# Patient Record
Sex: FEMALE | Race: WHITE | Employment: UNEMPLOYED | URBAN - METROPOLITAN AREA
[De-identification: names, ages, dates, MRNs, and addresses within clinical notes are randomized per-mention and may not be internally consistent; named-entity substitution may affect disease eponyms.]

---

## 2017-01-05 ENCOUNTER — OFFICE VISIT (OUTPATIENT)
Dept: FAMILY MEDICINE CLINIC | Age: 52
End: 2017-01-05

## 2017-01-05 VITALS
HEART RATE: 77 BPM | OXYGEN SATURATION: 98 % | BODY MASS INDEX: 25.99 KG/M2 | RESPIRATION RATE: 16 BRPM | WEIGHT: 156 LBS | DIASTOLIC BLOOD PRESSURE: 70 MMHG | HEIGHT: 65 IN | SYSTOLIC BLOOD PRESSURE: 124 MMHG

## 2017-01-05 DIAGNOSIS — Z23 NEED FOR SHINGLES VACCINE: ICD-10-CM

## 2017-01-05 DIAGNOSIS — Z00.00 ANNUAL PHYSICAL EXAM: Primary | ICD-10-CM

## 2017-01-05 DIAGNOSIS — Z23 NEED FOR TDAP VACCINATION: ICD-10-CM

## 2017-01-05 LAB
A/G RATIO: 1.5 (ref 1.1–2.2)
ALBUMIN SERPL-MCNC: 4.3 G/DL (ref 3.4–5)
ALP BLD-CCNC: 59 U/L (ref 40–129)
ALT SERPL-CCNC: 12 U/L (ref 10–40)
ANION GAP SERPL CALCULATED.3IONS-SCNC: 15 MMOL/L (ref 3–16)
AST SERPL-CCNC: 13 U/L (ref 15–37)
BILIRUB SERPL-MCNC: 0.4 MG/DL (ref 0–1)
BUN BLDV-MCNC: 9 MG/DL (ref 7–20)
CALCIUM SERPL-MCNC: 9.2 MG/DL (ref 8.3–10.6)
CHLORIDE BLD-SCNC: 97 MMOL/L (ref 99–110)
CHOLESTEROL, TOTAL: 245 MG/DL (ref 0–199)
CO2: 25 MMOL/L (ref 21–32)
CREAT SERPL-MCNC: <0.5 MG/DL (ref 0.6–1.1)
GFR AFRICAN AMERICAN: >60
GFR NON-AFRICAN AMERICAN: >60
GLOBULIN: 2.8 G/DL
GLUCOSE BLD-MCNC: 87 MG/DL (ref 70–99)
HCT VFR BLD CALC: 41.4 % (ref 36–48)
HDLC SERPL-MCNC: 46 MG/DL (ref 40–60)
HEMOGLOBIN: 13.5 G/DL (ref 12–16)
LDL CHOLESTEROL CALCULATED: 145 MG/DL
MCH RBC QN AUTO: 29.4 PG (ref 26–34)
MCHC RBC AUTO-ENTMCNC: 32.7 G/DL (ref 31–36)
MCV RBC AUTO: 89.7 FL (ref 80–100)
PDW BLD-RTO: 13.1 % (ref 12.4–15.4)
PLATELET # BLD: 309 K/UL (ref 135–450)
PMV BLD AUTO: 9.4 FL (ref 5–10.5)
POTASSIUM SERPL-SCNC: 4.6 MMOL/L (ref 3.5–5.1)
RBC # BLD: 4.61 M/UL (ref 4–5.2)
SODIUM BLD-SCNC: 137 MMOL/L (ref 136–145)
TOTAL PROTEIN: 7.1 G/DL (ref 6.4–8.2)
TRIGL SERPL-MCNC: 270 MG/DL (ref 0–150)
VITAMIN D 25-HYDROXY: 49.7 NG/ML
VLDLC SERPL CALC-MCNC: 54 MG/DL
WBC # BLD: 6.9 K/UL (ref 4–11)

## 2017-01-05 PROCEDURE — 99396 PREV VISIT EST AGE 40-64: CPT | Performed by: FAMILY MEDICINE

## 2017-01-05 PROCEDURE — 36415 COLL VENOUS BLD VENIPUNCTURE: CPT | Performed by: FAMILY MEDICINE

## 2017-01-05 PROCEDURE — 90715 TDAP VACCINE 7 YRS/> IM: CPT | Performed by: FAMILY MEDICINE

## 2017-01-05 PROCEDURE — 90471 IMMUNIZATION ADMIN: CPT | Performed by: FAMILY MEDICINE

## 2017-07-07 ENCOUNTER — HOSPITAL ENCOUNTER (OUTPATIENT)
Dept: SURGERY | Age: 52
Discharge: HOME | End: 2017-07-07
Attending: INTERNAL MEDICINE | Admitting: INTERNAL MEDICINE

## 2017-07-07 VITALS
SYSTOLIC BLOOD PRESSURE: 101 MMHG | BODY MASS INDEX: 23.32 KG/M2 | HEIGHT: 65 IN | DIASTOLIC BLOOD PRESSURE: 68 MMHG | HEART RATE: 72 BPM | TEMPERATURE: 97.7 F | WEIGHT: 140 LBS | OXYGEN SATURATION: 98 % | RESPIRATION RATE: 16 BRPM

## 2017-07-07 DIAGNOSIS — Z86.010 HISTORY OF COLONIC POLYPS: ICD-10-CM

## 2017-07-07 LAB — PREGNANCY, URINE: NEGATIVE

## 2017-07-07 RX ORDER — LIDOCAINE HYDROCHLORIDE 10 MG/ML
0.1 INJECTION, SOLUTION EPIDURAL; INFILTRATION; INTRACAUDAL; PERINEURAL
Status: ACTIVE | OUTPATIENT
Start: 2017-07-07 | End: 2017-07-07

## 2017-07-07 RX ORDER — SODIUM CHLORIDE, SODIUM LACTATE, POTASSIUM CHLORIDE, CALCIUM CHLORIDE 600; 310; 30; 20 MG/100ML; MG/100ML; MG/100ML; MG/100ML
INJECTION, SOLUTION INTRAVENOUS ONCE
Status: COMPLETED | OUTPATIENT
Start: 2017-07-07 | End: 2017-07-07

## 2017-07-07 RX ADMIN — SODIUM CHLORIDE, SODIUM LACTATE, POTASSIUM CHLORIDE, CALCIUM CHLORIDE: 600; 310; 30; 20 INJECTION, SOLUTION INTRAVENOUS at 09:05

## 2017-07-07 ASSESSMENT — PAIN SCALES - GENERAL
PAINLEVEL_OUTOF10: 0

## 2017-07-07 ASSESSMENT — PAIN - FUNCTIONAL ASSESSMENT: PAIN_FUNCTIONAL_ASSESSMENT: 0-10

## 2017-08-28 ENCOUNTER — OFFICE VISIT (OUTPATIENT)
Dept: FAMILY MEDICINE CLINIC | Age: 52
End: 2017-08-28

## 2017-08-28 VITALS
DIASTOLIC BLOOD PRESSURE: 78 MMHG | BODY MASS INDEX: 24.46 KG/M2 | SYSTOLIC BLOOD PRESSURE: 112 MMHG | HEART RATE: 73 BPM | TEMPERATURE: 98.5 F | WEIGHT: 147 LBS | OXYGEN SATURATION: 98 %

## 2017-08-28 DIAGNOSIS — F32.0 MILD SINGLE CURRENT EPISODE OF MAJOR DEPRESSIVE DISORDER (HCC): ICD-10-CM

## 2017-08-28 DIAGNOSIS — R04.0 EPISTAXIS, RECURRENT: Primary | ICD-10-CM

## 2017-08-28 PROCEDURE — 99214 OFFICE O/P EST MOD 30 MIN: CPT | Performed by: FAMILY MEDICINE

## 2017-08-28 RX ORDER — ESCITALOPRAM OXALATE 10 MG/1
TABLET ORAL
Qty: 30 TABLET | Refills: 3 | Status: SHIPPED | OUTPATIENT
Start: 2017-08-28 | End: 2020-10-26

## 2017-10-09 ENCOUNTER — OFFICE VISIT (OUTPATIENT)
Dept: FAMILY MEDICINE CLINIC | Age: 52
End: 2017-10-09

## 2017-10-09 VITALS
OXYGEN SATURATION: 98 % | DIASTOLIC BLOOD PRESSURE: 76 MMHG | WEIGHT: 151 LBS | BODY MASS INDEX: 25.13 KG/M2 | HEART RATE: 83 BPM | SYSTOLIC BLOOD PRESSURE: 116 MMHG

## 2017-10-09 DIAGNOSIS — F51.09 TERMINAL INSOMNIA: Primary | ICD-10-CM

## 2017-10-09 PROCEDURE — 99213 OFFICE O/P EST LOW 20 MIN: CPT | Performed by: FAMILY MEDICINE

## 2017-10-09 RX ORDER — TRAZODONE HYDROCHLORIDE 50 MG/1
50 TABLET ORAL NIGHTLY
Qty: 30 TABLET | Refills: 5 | Status: SHIPPED | OUTPATIENT
Start: 2017-10-09 | End: 2020-10-26

## 2017-10-09 ASSESSMENT — ENCOUNTER SYMPTOMS: RESPIRATORY NEGATIVE: 1

## 2017-10-09 NOTE — PATIENT INSTRUCTIONS
Cut lexapro in half. Take 1/2 pill daily x 4 days, then 1/2 pill every other day for 4 doses and stop.

## 2017-12-11 LAB
T4 FREE: 0.8
TSH SERPL DL<=0.05 MIU/L-ACNC: 2.27 UIU/ML

## 2018-01-08 ENCOUNTER — HOSPITAL ENCOUNTER (OUTPATIENT)
Dept: MAMMOGRAPHY | Age: 53
Discharge: HOME | End: 2018-01-08
Attending: OBSTETRICS & GYNECOLOGY | Admitting: OBSTETRICS & GYNECOLOGY

## 2018-01-08 DIAGNOSIS — Z12.31 ENCOUNTER FOR SCREENING MAMMOGRAM FOR MALIGNANT NEOPLASM OF BREAST: ICD-10-CM

## 2018-03-05 ENCOUNTER — OFFICE VISIT (OUTPATIENT)
Dept: FAMILY MEDICINE CLINIC | Age: 53
End: 2018-03-05

## 2018-03-05 ENCOUNTER — TELEPHONE (OUTPATIENT)
Dept: FAMILY MEDICINE CLINIC | Age: 53
End: 2018-03-05

## 2018-03-05 VITALS
OXYGEN SATURATION: 98 % | HEART RATE: 72 BPM | DIASTOLIC BLOOD PRESSURE: 86 MMHG | BODY MASS INDEX: 25.49 KG/M2 | HEIGHT: 65 IN | SYSTOLIC BLOOD PRESSURE: 134 MMHG | WEIGHT: 153 LBS

## 2018-03-05 DIAGNOSIS — E03.9 ACQUIRED HYPOTHYROIDISM: ICD-10-CM

## 2018-03-05 DIAGNOSIS — D17.0 LIPOMA OF NECK: ICD-10-CM

## 2018-03-05 DIAGNOSIS — H81.10 BENIGN PAROXYSMAL POSITIONAL VERTIGO, UNSPECIFIED LATERALITY: ICD-10-CM

## 2018-03-05 DIAGNOSIS — D35.2 PITUITARY MICROADENOMA (HCC): ICD-10-CM

## 2018-03-05 DIAGNOSIS — Z00.00 ANNUAL PHYSICAL EXAM: Primary | ICD-10-CM

## 2018-03-05 LAB
A/G RATIO: 1.6 (ref 1.1–2.2)
ALBUMIN SERPL-MCNC: 4.4 G/DL (ref 3.4–5)
ALP BLD-CCNC: 57 U/L (ref 40–129)
ALT SERPL-CCNC: 11 U/L (ref 10–40)
ANION GAP SERPL CALCULATED.3IONS-SCNC: 17 MMOL/L (ref 3–16)
AST SERPL-CCNC: 14 U/L (ref 15–37)
BILIRUB SERPL-MCNC: 0.5 MG/DL (ref 0–1)
BUN BLDV-MCNC: 8 MG/DL (ref 7–20)
CALCIUM SERPL-MCNC: 9 MG/DL (ref 8.3–10.6)
CHLORIDE BLD-SCNC: 101 MMOL/L (ref 99–110)
CHOLESTEROL, TOTAL: 221 MG/DL (ref 0–199)
CO2: 23 MMOL/L (ref 21–32)
CREAT SERPL-MCNC: 0.5 MG/DL (ref 0.6–1.1)
GFR AFRICAN AMERICAN: >60
GFR NON-AFRICAN AMERICAN: >60
GLOBULIN: 2.8 G/DL
GLUCOSE BLD-MCNC: 94 MG/DL (ref 70–99)
HCT VFR BLD CALC: 39.6 % (ref 36–48)
HDLC SERPL-MCNC: 47 MG/DL (ref 40–60)
HEMOGLOBIN: 13 G/DL (ref 12–16)
HEPATITIS C ANTIBODY INTERPRETATION: NORMAL
LDL CHOLESTEROL CALCULATED: 132 MG/DL
MCH RBC QN AUTO: 29.9 PG (ref 26–34)
MCHC RBC AUTO-ENTMCNC: 32.9 G/DL (ref 31–36)
MCV RBC AUTO: 91 FL (ref 80–100)
PDW BLD-RTO: 13.5 % (ref 12.4–15.4)
PLATELET # BLD: 329 K/UL (ref 135–450)
PMV BLD AUTO: 9.5 FL (ref 5–10.5)
POTASSIUM SERPL-SCNC: 4.6 MMOL/L (ref 3.5–5.1)
RBC # BLD: 4.35 M/UL (ref 4–5.2)
SODIUM BLD-SCNC: 141 MMOL/L (ref 136–145)
TOTAL PROTEIN: 7.2 G/DL (ref 6.4–8.2)
TRIGL SERPL-MCNC: 210 MG/DL (ref 0–150)
VLDLC SERPL CALC-MCNC: 42 MG/DL
WBC # BLD: 6.1 K/UL (ref 4–11)

## 2018-03-05 PROCEDURE — 99396 PREV VISIT EST AGE 40-64: CPT | Performed by: FAMILY MEDICINE

## 2018-03-05 PROCEDURE — 36415 COLL VENOUS BLD VENIPUNCTURE: CPT | Performed by: FAMILY MEDICINE

## 2018-03-05 ASSESSMENT — ENCOUNTER SYMPTOMS
COLOR CHANGE: 0
BLOOD IN STOOL: 0
COUGH: 0
ABDOMINAL DISTENTION: 0
SHORTNESS OF BREATH: 0
CHEST TIGHTNESS: 0
WHEEZING: 0
DIARRHEA: 0
EYES NEGATIVE: 1
VOMITING: 0
CONSTIPATION: 0
ABDOMINAL PAIN: 0
NAUSEA: 0

## 2018-03-05 NOTE — PROGRESS NOTES
Assessment/Plan:    Gill Jones was seen today for annual exam.    Diagnoses and all orders for this visit:    Annual physical exam  -     Comprehensive Metabolic Panel  -     CBC  -     Lipid Panel  -     HIV Screen  -     Hepatitis C Antibody   Sees Dr Noah Crandall next wk for pap    Acquired hypothyroidism   Per Dr Kristin Burr microadenoma [facility])   Per Dr Jose Ramon Guillen    Lipoma of neck   If it becomes bothersome, will refer to Dr Gabbi Blake.     Benign paroxysmal positional vertigo, unspecified laterality   Resolved with PT

## 2018-03-06 LAB
HIV AG/AB: NORMAL
HIV ANTIGEN: NORMAL
HIV-1 ANTIBODY: NORMAL
HIV-2 AB: NORMAL

## 2019-01-23 ENCOUNTER — HOSPITAL ENCOUNTER (OUTPATIENT)
Dept: WOMENS IMAGING | Age: 54
Discharge: HOME | End: 2019-01-23
Payer: COMMERCIAL

## 2019-01-23 DIAGNOSIS — Z13.820 ENCOUNTER FOR IMAGING TO ASSESS OSTEOPOROSIS: ICD-10-CM

## 2019-01-23 PROCEDURE — 77067 SCR MAMMO BI INCL CAD: CPT

## 2019-03-18 ENCOUNTER — OFFICE VISIT (OUTPATIENT)
Dept: FAMILY MEDICINE CLINIC | Age: 54
End: 2019-03-18
Payer: COMMERCIAL

## 2019-03-18 VITALS
OXYGEN SATURATION: 98 % | WEIGHT: 142 LBS | RESPIRATION RATE: 16 BRPM | SYSTOLIC BLOOD PRESSURE: 110 MMHG | BODY MASS INDEX: 24.24 KG/M2 | HEART RATE: 78 BPM | HEIGHT: 64 IN | DIASTOLIC BLOOD PRESSURE: 82 MMHG

## 2019-03-18 DIAGNOSIS — Z00.00 ANNUAL PHYSICAL EXAM: Primary | ICD-10-CM

## 2019-03-18 DIAGNOSIS — F51.01 PRIMARY INSOMNIA: ICD-10-CM

## 2019-03-18 DIAGNOSIS — E03.9 ACQUIRED HYPOTHYROIDISM: ICD-10-CM

## 2019-03-18 LAB
A/G RATIO: 1.4 (ref 1.1–2.2)
ALBUMIN SERPL-MCNC: 4.1 G/DL (ref 3.4–5)
ALP BLD-CCNC: 60 U/L (ref 40–129)
ALT SERPL-CCNC: 12 U/L (ref 10–40)
ANION GAP SERPL CALCULATED.3IONS-SCNC: 13 MMOL/L (ref 3–16)
AST SERPL-CCNC: 14 U/L (ref 15–37)
BILIRUB SERPL-MCNC: <0.2 MG/DL (ref 0–1)
BUN BLDV-MCNC: 11 MG/DL (ref 7–20)
CALCIUM SERPL-MCNC: 9.3 MG/DL (ref 8.3–10.6)
CHLORIDE BLD-SCNC: 101 MMOL/L (ref 99–110)
CHOLESTEROL, TOTAL: 206 MG/DL (ref 0–199)
CO2: 24 MMOL/L (ref 21–32)
CREAT SERPL-MCNC: 0.6 MG/DL (ref 0.6–1.1)
GFR AFRICAN AMERICAN: >60
GFR NON-AFRICAN AMERICAN: >60
GLOBULIN: 2.9 G/DL
GLUCOSE BLD-MCNC: 94 MG/DL (ref 70–99)
HDLC SERPL-MCNC: 42 MG/DL (ref 40–60)
LDL CHOLESTEROL CALCULATED: 124 MG/DL
POTASSIUM SERPL-SCNC: 4.7 MMOL/L (ref 3.5–5.1)
SODIUM BLD-SCNC: 138 MMOL/L (ref 136–145)
TOTAL PROTEIN: 7 G/DL (ref 6.4–8.2)
TRIGL SERPL-MCNC: 199 MG/DL (ref 0–150)
VLDLC SERPL CALC-MCNC: 40 MG/DL

## 2019-03-18 PROCEDURE — 99396 PREV VISIT EST AGE 40-64: CPT | Performed by: FAMILY MEDICINE

## 2019-03-18 PROCEDURE — 36415 COLL VENOUS BLD VENIPUNCTURE: CPT | Performed by: FAMILY MEDICINE

## 2019-03-18 RX ORDER — HYDROXYZINE HYDROCHLORIDE 25 MG/1
25 TABLET, FILM COATED ORAL NIGHTLY PRN
Qty: 30 TABLET | Refills: 0 | Status: SHIPPED | OUTPATIENT
Start: 2019-03-18 | End: 2019-04-10 | Stop reason: SDUPTHER

## 2019-03-18 ASSESSMENT — ENCOUNTER SYMPTOMS
CONSTIPATION: 0
EYES NEGATIVE: 1
VOMITING: 0
DIARRHEA: 0
NAUSEA: 0
BLOOD IN STOOL: 0
SHORTNESS OF BREATH: 0
CHEST TIGHTNESS: 0
COLOR CHANGE: 0
WHEEZING: 0
COUGH: 0
ABDOMINAL PAIN: 0
ABDOMINAL DISTENTION: 0

## 2019-03-18 ASSESSMENT — PATIENT HEALTH QUESTIONNAIRE - PHQ9
SUM OF ALL RESPONSES TO PHQ QUESTIONS 1-9: 0
SUM OF ALL RESPONSES TO PHQ9 QUESTIONS 1 & 2: 0
2. FEELING DOWN, DEPRESSED OR HOPELESS: 0
SUM OF ALL RESPONSES TO PHQ QUESTIONS 1-9: 0
1. LITTLE INTEREST OR PLEASURE IN DOING THINGS: 0

## 2019-04-11 RX ORDER — HYDROXYZINE HYDROCHLORIDE 25 MG/1
TABLET, FILM COATED ORAL
Qty: 30 TABLET | Refills: 0 | Status: SHIPPED | OUTPATIENT
Start: 2019-04-11 | End: 2019-06-01 | Stop reason: SDUPTHER

## 2019-09-16 RX ORDER — HYDROXYZINE HYDROCHLORIDE 25 MG/1
TABLET, FILM COATED ORAL
Qty: 90 TABLET | Refills: 1 | Status: SHIPPED | OUTPATIENT
Start: 2019-09-16 | End: 2020-04-24

## 2020-04-24 RX ORDER — HYDROXYZINE HYDROCHLORIDE 25 MG/1
TABLET, FILM COATED ORAL
Qty: 90 TABLET | Refills: 1 | Status: SHIPPED | OUTPATIENT
Start: 2020-04-24 | End: 2020-10-14

## 2020-05-19 ENCOUNTER — HOSPITAL ENCOUNTER (OUTPATIENT)
Dept: WOMENS IMAGING | Age: 55
Discharge: HOME | End: 2020-05-19
Payer: COMMERCIAL

## 2020-05-19 PROCEDURE — 77063 BREAST TOMOSYNTHESIS BI: CPT

## 2020-05-20 ENCOUNTER — TELEPHONE (OUTPATIENT)
Dept: WOMENS IMAGING | Age: 55
End: 2020-05-20

## 2020-05-21 ENCOUNTER — TELEPHONE (OUTPATIENT)
Dept: WOMENS IMAGING | Age: 55
End: 2020-05-21

## 2020-10-26 ENCOUNTER — OFFICE VISIT (OUTPATIENT)
Dept: FAMILY MEDICINE CLINIC | Age: 55
End: 2020-10-26
Payer: COMMERCIAL

## 2020-10-26 VITALS
BODY MASS INDEX: 27.14 KG/M2 | DIASTOLIC BLOOD PRESSURE: 78 MMHG | HEIGHT: 64 IN | SYSTOLIC BLOOD PRESSURE: 130 MMHG | TEMPERATURE: 97.8 F | WEIGHT: 159 LBS | OXYGEN SATURATION: 98 % | HEART RATE: 82 BPM

## 2020-10-26 LAB
A/G RATIO: 1.5 (ref 1.1–2.2)
ALBUMIN SERPL-MCNC: 4.3 G/DL (ref 3.4–5)
ALP BLD-CCNC: 69 U/L (ref 40–129)
ALT SERPL-CCNC: 14 U/L (ref 10–40)
ANION GAP SERPL CALCULATED.3IONS-SCNC: 15 MMOL/L (ref 3–16)
AST SERPL-CCNC: 15 U/L (ref 15–37)
BASOPHILS ABSOLUTE: 0.1 K/UL (ref 0–0.2)
BASOPHILS RELATIVE PERCENT: 0.9 %
BILIRUB SERPL-MCNC: 0.5 MG/DL (ref 0–1)
BUN BLDV-MCNC: 10 MG/DL (ref 7–20)
CALCIUM SERPL-MCNC: 9.1 MG/DL (ref 8.3–10.6)
CHLORIDE BLD-SCNC: 101 MMOL/L (ref 99–110)
CHOLESTEROL, TOTAL: 255 MG/DL (ref 0–199)
CO2: 22 MMOL/L (ref 21–32)
CREAT SERPL-MCNC: 0.6 MG/DL (ref 0.6–1.1)
EOSINOPHILS ABSOLUTE: 0.1 K/UL (ref 0–0.6)
EOSINOPHILS RELATIVE PERCENT: 1.2 %
GFR AFRICAN AMERICAN: >60
GFR NON-AFRICAN AMERICAN: >60
GLOBULIN: 2.8 G/DL
GLUCOSE BLD-MCNC: 90 MG/DL (ref 70–99)
HCT VFR BLD CALC: 40.7 % (ref 36–48)
HDLC SERPL-MCNC: 43 MG/DL (ref 40–60)
HEMOGLOBIN: 13.4 G/DL (ref 12–16)
LDL CHOLESTEROL CALCULATED: 167 MG/DL
LYMPHOCYTES ABSOLUTE: 2.3 K/UL (ref 1–5.1)
LYMPHOCYTES RELATIVE PERCENT: 31.9 %
MCH RBC QN AUTO: 30 PG (ref 26–34)
MCHC RBC AUTO-ENTMCNC: 33 G/DL (ref 31–36)
MCV RBC AUTO: 90.9 FL (ref 80–100)
MONOCYTES ABSOLUTE: 0.6 K/UL (ref 0–1.3)
MONOCYTES RELATIVE PERCENT: 7.9 %
NEUTROPHILS ABSOLUTE: 4.2 K/UL (ref 1.7–7.7)
NEUTROPHILS RELATIVE PERCENT: 58.1 %
PDW BLD-RTO: 12.9 % (ref 12.4–15.4)
PLATELET # BLD: 378 K/UL (ref 135–450)
PMV BLD AUTO: 8.9 FL (ref 5–10.5)
POTASSIUM SERPL-SCNC: 4.8 MMOL/L (ref 3.5–5.1)
RBC # BLD: 4.47 M/UL (ref 4–5.2)
SODIUM BLD-SCNC: 138 MMOL/L (ref 136–145)
TOTAL PROTEIN: 7.1 G/DL (ref 6.4–8.2)
TRIGL SERPL-MCNC: 226 MG/DL (ref 0–150)
VLDLC SERPL CALC-MCNC: 45 MG/DL
WBC # BLD: 7.2 K/UL (ref 4–11)

## 2020-10-26 PROCEDURE — 36415 COLL VENOUS BLD VENIPUNCTURE: CPT | Performed by: FAMILY MEDICINE

## 2020-10-26 PROCEDURE — 99396 PREV VISIT EST AGE 40-64: CPT | Performed by: FAMILY MEDICINE

## 2020-10-26 RX ORDER — LEVOTHYROXINE SODIUM 88 UG/1
88 TABLET ORAL DAILY
COMMUNITY
Start: 2020-07-06

## 2020-10-26 ASSESSMENT — ENCOUNTER SYMPTOMS
EYES NEGATIVE: 1
ABDOMINAL PAIN: 0
CONSTIPATION: 1
DIARRHEA: 1
NAUSEA: 0
SHORTNESS OF BREATH: 0
BLOOD IN STOOL: 0
COUGH: 0

## 2020-10-26 ASSESSMENT — PATIENT HEALTH QUESTIONNAIRE - PHQ9
SUM OF ALL RESPONSES TO PHQ QUESTIONS 1-9: 0
SUM OF ALL RESPONSES TO PHQ QUESTIONS 1-9: 0
2. FEELING DOWN, DEPRESSED OR HOPELESS: 0
1. LITTLE INTEREST OR PLEASURE IN DOING THINGS: 0
SUM OF ALL RESPONSES TO PHQ9 QUESTIONS 1 & 2: 0
SUM OF ALL RESPONSES TO PHQ QUESTIONS 1-9: 0

## 2020-10-26 NOTE — PROGRESS NOTES
Patient: Norma Walter is a 54 y. o.female who presents today with the following Chief Complaint(s):  Chief Complaint   Patient presents with    Annual Exam     physical         HPI: Pt with hx of bulemia, pit microadenoma s/p bromocriptine x 10 yr and hypothyroidism conts to see farhad Bartholomew. Per pt, prolactin has recently been sl high, but b/c pt has no nipple d/c or visual distortion, no need to resume bromocriptine. Conts [facility] as she has long term. Wt is up 17 lb in past 18 mo despite [facility]. Has felt tired despite slight increase from 75 to 88 mcg 2 mo ago. Sleeps well with hydroxyzine. Feels she is tolerating stress. One son is getting , lives locally, other is doing well in [place], has 11 mo old baby that pt has yet to meet 2/2 covid. Cares for elderly mother. Sister and brother help as well. Denies snoring. Donates blood q 12 wk on avg. Does exercise classes tiw, cleans 6 houses per wk, is very active. Gyn has pt cont ocp to lower risk of female ca. Has menses q 3 mo with Seasonique. No hot flashes. Current Outpatient Medications   Medication Sig Dispense Refill    levothyroxine (SYNTHROID) 88 MCG tablet Take 88 mcg by mouth daily      hydrOXYzine (ATARAX) 25 MG tablet TAKE 1 TABLET BY MOUTH NIGHTLY AS NEEDED FOR INSOMNIA 90 tablet 3    Omega-3 Fatty Acids (FISH OIL PO) Take by mouth      Levonorgest-Eth Estrad 91-Day 0.15-0.03 &0.01 MG TABS Take 1 tablet by mouth daily.  calcium carbonate (OSCAL) 500 MG TABS tablet Take 500 mg by mouth daily.  Multiple Vitamin (MULTIVITAMIN PO) Take  by mouth. No current facility-administered medications for this visit. Patient's past medical history,surgical history, family history, medications,  and allergies  were all reviewed and updated as appropriate today. Review of Systems   Constitutional: Positive for fatigue and unexpected weight change. Negative for activity change and appetite change. HENT: Negative. Eyes: Negative. Respiratory: Negative for cough and shortness of breath. Cardiovascular: Negative for chest pain and palpitations. Gastrointestinal: Positive for constipation (on occasion, presumed IBS) and diarrhea (on occasion, presumed IBS). Negative for abdominal pain, blood in stool and nausea. Genitourinary: Negative for difficulty urinating and dysuria. Musculoskeletal: Negative for arthralgias. Skin: Negative for rash. Neurological: Positive for headaches (noted in traps after workouts with weights). Negative for dizziness. Hematological: Negative. Psychiatric/Behavioral: Negative for dysphoric mood and sleep disturbance. The patient is not nervous/anxious. Physical Exam  Constitutional:       General: She is not in acute distress. Appearance: Normal appearance. She is well-developed. She is not ill-appearing. HENT:      Head: Normocephalic and atraumatic. Right Ear: Tympanic membrane, ear canal and external ear normal.      Left Ear: Tympanic membrane, ear canal and external ear normal.   Eyes:      General: No scleral icterus. Right eye: No discharge. Left eye: No discharge. Extraocular Movements: Extraocular movements intact. Conjunctiva/sclera: Conjunctivae normal.   Neck:      Musculoskeletal: Normal range of motion and neck supple. Vascular: No carotid bruit. Comments: Neg TMG  Cardiovascular:      Rate and Rhythm: Normal rate and regular rhythm. Pulses: Normal pulses. Heart sounds: Normal heart sounds. No murmur. Pulmonary:      Effort: Pulmonary effort is normal. No respiratory distress. Breath sounds: Normal breath sounds. Abdominal:      General: Bowel sounds are normal. There is no distension. Palpations: Abdomen is soft. There is no mass. Tenderness: There is no abdominal tenderness. Musculoskeletal: Normal range of motion. Right lower leg: No edema. Left lower leg: No edema. Lymphadenopathy:      Cervical: No cervical adenopathy. Skin:     General: Skin is warm and dry. Capillary Refill: Capillary refill takes less than 2 seconds. Coloration: Skin is not jaundiced or pale. Findings: No rash. Neurological:      General: No focal deficit present. Mental Status: She is alert and oriented to person, place, and time. Cranial Nerves: No cranial nerve deficit. Deep Tendon Reflexes: Reflexes are normal and symmetric. Psychiatric:         Mood and Affect: Mood normal.         Behavior: Behavior normal.         Thought Content:  Thought content normal.         Judgment: Judgment normal.           /78 (Site: Right Upper Arm, Position: Sitting, Cuff Size: Medium Adult)   Pulse 82   Temp 97.8 °F (36.6 °C)   Ht 5' 4.25\" (1.632 m)   Wt 159 lb (72.1 kg)   SpO2 98%   BMI 27.08 kg/m²     Assessment/Plan:    Audley Hamman was seen today for annual exam.    Diagnoses and all orders for this visit:    Annual physical exam  -     Comprehensive Metabolic Panel  -     Lipid Panel    Fatigue, unspecified type, in pt who donates blood  -     CBC Auto Differential    Pituitary microadenoma [facility]), Acquired hypothyroidism   Per Dr Lauri Patterson

## 2021-06-29 ENCOUNTER — HOSPITAL ENCOUNTER (OUTPATIENT)
Dept: WOMENS IMAGING | Age: 56
Discharge: HOME | End: 2021-06-29
Payer: COMMERCIAL

## 2021-06-29 VITALS — HEIGHT: 65 IN | BODY MASS INDEX: 24.99 KG/M2 | WEIGHT: 150 LBS

## 2021-06-29 DIAGNOSIS — Z12.31 ENCOUNTER FOR SCREENING MAMMOGRAM FOR BREAST CANCER: ICD-10-CM

## 2021-06-29 PROCEDURE — 77063 BREAST TOMOSYNTHESIS BI: CPT

## 2021-09-13 ENCOUNTER — TELEPHONE (OUTPATIENT)
Dept: FAMILY MEDICINE CLINIC | Age: 56
End: 2021-09-13

## 2021-09-13 NOTE — TELEPHONE ENCOUNTER
Pt contacted and she was informed there was no opening for today. She chose to go to an [address] [place] [address] for treatment.

## 2021-09-17 ENCOUNTER — OFFICE VISIT (OUTPATIENT)
Dept: FAMILY MEDICINE CLINIC | Age: 56
End: 2021-09-17
Payer: COMMERCIAL

## 2021-09-17 VITALS
TEMPERATURE: 97.5 F | OXYGEN SATURATION: 98 % | SYSTOLIC BLOOD PRESSURE: 124 MMHG | HEART RATE: 83 BPM | WEIGHT: 161 LBS | DIASTOLIC BLOOD PRESSURE: 80 MMHG | HEIGHT: 65 IN | BODY MASS INDEX: 26.82 KG/M2

## 2021-09-17 DIAGNOSIS — H93.8X3 EAR PRESSURE, BILATERAL: Primary | ICD-10-CM

## 2021-09-17 DIAGNOSIS — R42 VERTIGO: ICD-10-CM

## 2021-09-17 PROCEDURE — 99213 OFFICE O/P EST LOW 20 MIN: CPT | Performed by: REGISTERED NURSE

## 2021-09-17 RX ORDER — MECLIZINE HCL 12.5 MG/1
12.5 TABLET ORAL 3 TIMES DAILY PRN
Qty: 30 TABLET | Refills: 0 | Status: SHIPPED | OUTPATIENT
Start: 2021-09-17 | End: 2022-05-19

## 2021-09-17 ASSESSMENT — ENCOUNTER SYMPTOMS
SHORTNESS OF BREATH: 0
SORE THROAT: 0
NAUSEA: 0
COUGH: 0

## 2021-09-17 ASSESSMENT — PATIENT HEALTH QUESTIONNAIRE - PHQ9
1. LITTLE INTEREST OR PLEASURE IN DOING THINGS: 0
SUM OF ALL RESPONSES TO PHQ9 QUESTIONS 1 & 2: 0
SUM OF ALL RESPONSES TO PHQ QUESTIONS 1-9: 0
2. FEELING DOWN, DEPRESSED OR HOPELESS: 0
SUM OF ALL RESPONSES TO PHQ QUESTIONS 1-9: 0
SUM OF ALL RESPONSES TO PHQ QUESTIONS 1-9: 0

## 2021-09-17 NOTE — PROGRESS NOTES
Patient: Angie Barnard is a 54 y.o. female who presents today with the following Chief Complaint(s):  Chief Complaint   Patient presents with    Ear Problem     Bilateral     Dizziness     Started mo ago with vertigo    Hypertension       Assessment/Plan:    Her main concern today is bilateral ear pressure. There is some mild middle ear effusion greater on the left. Recommend Flonase twice a day for 1 week and then once a day for 3 weeks. Discussed that symptoms could be due to seasonal or environmental allergies. Her vertigo has improved significantly since her chiropractor performed Epley maneuvers on her. Meclizine prescribed. She will follow-up with her chiropractor or ENT if vertigo returns. Referral was given. Her blood pressure is normal at 124/80 today. 1. Ear pressure, bilateral    - meclizine (ANTIVERT) 12.5 MG tablet; Take 1 tablet by mouth 3 times daily as needed for Dizziness  Dispense: 30 tablet; Refill: Kenn Barnett DO, Otolaryngology, [place]    2. Vertigo    - meclizine (ANTIVERT) 12.5 MG tablet; Take 1 tablet by mouth 3 times daily as needed for Dizziness  Dispense: 30 tablet; Refill: Kenn Barnett DO, Otolaryngology, [place]cho Tatum      Return if symptoms worsen or fail to improve. HPI:     She is here for ear pain and vertigo. She had an episode of vertigo on 8/16/21. She was seen by her chiropractor who performed Epley maneuvers. This helped. A week later her left ear started having a feeling of pressure. Then her right ear started having the same sensation. She went to a walk in clinic and she says they did not think she had an ear infection but she was told that her blood pressure was elevated. She still has intermittent vertigo. She did try pseudoephedrine but does not think this helped. Ear Problem  This is a new problem. The current episode started 1 to 4 weeks ago. The problem occurs constantly. Associated symptoms include headaches. She is not ill-appearing. HENT:      Head: Normocephalic and atraumatic. Right Ear: Ear canal and external ear normal. A middle ear effusion is present. There is no impacted cerumen. Tympanic membrane is not scarred, perforated or erythematous. Left Ear: Ear canal and external ear normal. A middle ear effusion is present. There is no impacted cerumen. Tympanic membrane is not scarred, perforated or erythematous. Cardiovascular:      Rate and Rhythm: Normal rate. Pulmonary:      Effort: Pulmonary effort is normal. No respiratory distress. Musculoskeletal:         General: Normal range of motion. Cervical back: Normal range of motion. Right lower leg: No edema. Left lower leg: No edema. Skin:     General: Skin is warm and dry. Coloration: Skin is not jaundiced or pale. Findings: No erythema. Neurological:      General: No focal deficit present. Mental Status: She is alert and oriented to person, place, and time. Psychiatric:         Mood and Affect: Mood normal.         Behavior: Behavior normal.         Thought Content: Thought content normal.         Judgment: Judgment normal.         Patient's past medical history, surgical history, family history, medications,  and allergies  were all reviewed and updated as appropriate today.     Patient Active Problem List   Diagnosis    Bulimia    Pituitary microadenoma ([address]    Family history of colon cancer    Hypothyroid    HLD (hyperlipidemia)    Lipoma of neck     Past Medical History:   Diagnosis Date    Bulimia     history of bulimia in high school    Family history of colon cancer     Cscope q 3 yrs    History of bulimia     high school    HLD (hyperlipidemia)     Hypothyroid 01/2015    Dr Mik Martin, endocrinologist    Pituitary microadenoma     Lifelong bromocriptine was planned though pt was able to d/c it 2015 w/o recurrence of prolactinoma      Past Surgical History:   Procedure Laterality Date    COLONOSCOPY  5/21/10    3 years    COLONOSCOPY  07/07/2017    LAPAROSCOPY      endomitriosis    TONSILLECTOMY      at age 21       Family History   Problem Relation Age of Onset    Colon Cancer Mother         dx at age 48   [place] Cancer Mother 48        colon    Diabetes Father     High Blood Pressure Father     Heart Disease Paternal Grandfather     Cancer Maternal Grandfather         colon cancer age 66    Cancer Other         maternal aunt, colon cancer age 47      No Known Allergies    This chart was generated using the [address] [place] [address] dictation system. I created this record but it may contain dictation errors due to the limitation of the software.

## 2021-10-01 RX ORDER — HYDROXYZINE HYDROCHLORIDE 25 MG/1
TABLET, FILM COATED ORAL
Qty: 90 TABLET | Refills: 3 | Status: SHIPPED | OUTPATIENT
Start: 2021-10-01 | End: 2022-10-03

## 2021-11-08 ENCOUNTER — OFFICE VISIT (OUTPATIENT)
Dept: FAMILY MEDICINE CLINIC | Age: 56
End: 2021-11-08
Payer: COMMERCIAL

## 2021-11-08 VITALS
HEART RATE: 77 BPM | OXYGEN SATURATION: 98 % | HEIGHT: 65 IN | SYSTOLIC BLOOD PRESSURE: 112 MMHG | DIASTOLIC BLOOD PRESSURE: 80 MMHG | WEIGHT: 160 LBS | BODY MASS INDEX: 26.66 KG/M2

## 2021-11-08 DIAGNOSIS — R42 VERTIGO: ICD-10-CM

## 2021-11-08 DIAGNOSIS — D35.2 PITUITARY MICROADENOMA (HCC): ICD-10-CM

## 2021-11-08 DIAGNOSIS — H93.8X3 EAR PRESSURE, BILATERAL: ICD-10-CM

## 2021-11-08 DIAGNOSIS — E78.2 MIXED HYPERLIPIDEMIA: ICD-10-CM

## 2021-11-08 DIAGNOSIS — Z00.00 ANNUAL PHYSICAL EXAM: Primary | ICD-10-CM

## 2021-11-08 DIAGNOSIS — E03.9 ACQUIRED HYPOTHYROIDISM: ICD-10-CM

## 2021-11-08 LAB
A/G RATIO: 1.6 (ref 1.1–2.2)
ALBUMIN SERPL-MCNC: 4.4 G/DL (ref 3.4–5)
ALP BLD-CCNC: 70 U/L (ref 40–129)
ALT SERPL-CCNC: 21 U/L (ref 10–40)
ANION GAP SERPL CALCULATED.3IONS-SCNC: 15 MMOL/L (ref 3–16)
AST SERPL-CCNC: 22 U/L (ref 15–37)
BILIRUB SERPL-MCNC: 0.4 MG/DL (ref 0–1)
BUN BLDV-MCNC: 17 MG/DL (ref 7–20)
CALCIUM SERPL-MCNC: 9.4 MG/DL (ref 8.3–10.6)
CHLORIDE BLD-SCNC: 101 MMOL/L (ref 99–110)
CHOLESTEROL, TOTAL: 244 MG/DL (ref 0–199)
CO2: 21 MMOL/L (ref 21–32)
CREAT SERPL-MCNC: 0.6 MG/DL (ref 0.6–1.1)
GFR AFRICAN AMERICAN: >60
GFR NON-AFRICAN AMERICAN: >60
GLUCOSE BLD-MCNC: 85 MG/DL (ref 70–99)
HDLC SERPL-MCNC: 50 MG/DL (ref 40–60)
LDL CHOLESTEROL CALCULATED: 148 MG/DL
POTASSIUM SERPL-SCNC: 4.6 MMOL/L (ref 3.5–5.1)
SODIUM BLD-SCNC: 137 MMOL/L (ref 136–145)
TOTAL PROTEIN: 7.2 G/DL (ref 6.4–8.2)
TRIGL SERPL-MCNC: 230 MG/DL (ref 0–150)
VLDLC SERPL CALC-MCNC: 46 MG/DL

## 2021-11-08 PROCEDURE — 99396 PREV VISIT EST AGE 40-64: CPT | Performed by: FAMILY MEDICINE

## 2021-11-08 PROCEDURE — 36415 COLL VENOUS BLD VENIPUNCTURE: CPT | Performed by: FAMILY MEDICINE

## 2021-11-08 RX ORDER — FLUTICASONE PROPIONATE 50 MCG
2 SPRAY, SUSPENSION (ML) NASAL DAILY
Qty: 16 G | Refills: 5 | Status: SHIPPED | OUTPATIENT
Start: 2021-11-08 | End: 2021-11-30

## 2021-11-08 NOTE — PROGRESS NOTES
Assessment/Plan:    Tony Alfonso was seen today for annual exam and ear problem. Diagnoses and all orders for this visit:    Annual physical exam    Ear pressure, bilateral  -     fluticasone (FLONASE) 50 MCG/ACT nasal spray; 2 sprays by Each Nostril route daily   Pt is doing better, though eval by Dr Minal Alejandro is very reasonable. Pt will keep upcoming appt. Vertigo  -     fluticasone (FLONASE) 50 MCG/ACT nasal spray; 2 sprays by Each Nostril route daily, agree with resuming. Pt was hesitant bc otc box mentioned flonase should not be used more than 1 mo. Assured pt long term flonase is very acceptable. Pituitary microadenoma [facility])   Per Dr Bart Page    Acquired hypothyroidism   Dr Bart Page follows thyr labs. Mixed hyperlipidemia  -     Lipid Panel  -     Comprehensive Metabolic Panel   Pt hopes to avoid statin. ASCVD 2.2% using last yr's labs. Await current labs. There are no Patient Instructions on file for this visit. Patient: Gerard Dupree is a 64 y. o.female who presents today with the following Chief Complaint(s):  Chief Complaint   Patient presents with    Annual Exam    Ear Problem         HPI: Pt with hx of bulemia, pit microadenoma s/p bromocriptine x 10 yr and hypothyroidism conts to see Dr Bart Page, endo. No nipple d/c or visual distortion. Sleeps well with hydroxyzine. Local son  last yr. Other son in [place] with wife and 3 1/2 yr old granddtr, all doing well. Son and family spend 3 mo with pt in recent mo's, enjoyable. Donates blood q 12 wk on avg, is A-. Does exercise classes tiw, cleans 6 houses per wk, is very active.     Gyn has pt cont ocp to lower risk of female ca. Has menses q 3 mo with Seasonique. No hot flashes.     Pt was seen 9/17/21 for ear pressure and vertigo that had improved when chiro performed Epley. Meclizine and flonase x 1 mo were rx'd. Pt was referred to Dr Minal Alejandro, ENT. Had 1 episode of vertigo remotely.      After stopping flonase after 1 mo, sx's returned. Three days ago, pt resumed flonase and dizziness and ear pressure have improved. Has ENT appt in 1 wk, wonders if she should keep appt. F was diabetic. Bro was recently dx'd as borderline DM. Current Outpatient Medications   Medication Sig Dispense Refill    fluticasone (FLONASE) 50 MCG/ACT nasal spray 2 sprays by Each Nostril route daily 16 g 5    hydrOXYzine (ATARAX) 25 MG tablet TAKE 1 TABLET BY MOUTH NIGHTLY AS NEEDED FOR INSOMNIA. 90 tablet 3    levothyroxine (SYNTHROID) 88 MCG tablet Take 88 mcg by mouth daily      Omega-3 Fatty Acids (FISH OIL PO) Take by mouth      Levonorgest-Eth Estrad 91-Day 0.15-0.03 &0.01 MG TABS Take 1 tablet by mouth daily.  calcium carbonate (OSCAL) 500 MG TABS tablet Take 500 mg by mouth daily.  Multiple Vitamin (MULTIVITAMIN PO) Take  by mouth. No current facility-administered medications for this visit. Patient's past medical history,surgical history, family history, medications,  and allergies  were all reviewed and updated as appropriate today. Review of Systems  abv    Physical Exam  Constitutional:       General: She is not in acute distress. Appearance: Normal appearance. She is well-developed. She is not ill-appearing. HENT:      Head: Normocephalic and atraumatic. Right Ear: Tympanic membrane, ear canal and external ear normal.      Left Ear: Tympanic membrane, ear canal and external ear normal.   Eyes:      General: No scleral icterus. Right eye: No discharge. Left eye: No discharge. Extraocular Movements: Extraocular movements intact. Conjunctiva/sclera: Conjunctivae normal.   Neck:      Vascular: No carotid bruit. Comments: Neg TMG  Cardiovascular:      Rate and Rhythm: Normal rate and regular rhythm. Pulses: Normal pulses. Heart sounds: Normal heart sounds. No murmur heard. Pulmonary:      Effort: Pulmonary effort is normal. No respiratory distress.       Breath sounds: Normal breath sounds. Abdominal:      General: Bowel sounds are normal. There is no distension. Palpations: Abdomen is soft. There is no mass. Tenderness: There is no abdominal tenderness. Musculoskeletal:         General: Normal range of motion. Cervical back: Normal range of motion and neck supple. Right lower leg: No edema. Left lower leg: No edema. Lymphadenopathy:      Cervical: No cervical adenopathy. Skin:     General: Skin is warm and dry. Capillary Refill: Capillary refill takes less than 2 seconds. Coloration: Skin is not jaundiced or pale. Findings: No rash. Neurological:      General: No focal deficit present. Mental Status: She is alert and oriented to person, place, and time. Cranial Nerves: No cranial nerve deficit. Deep Tendon Reflexes: Reflexes are normal and symmetric. Comments: No nystagmus   Psychiatric:         Mood and Affect: Mood normal.         Behavior: Behavior normal.         Thought Content:  Thought content normal.         Judgment: Judgment normal.           /80   Pulse 77   Ht 5' 5\" (1.651 m)   Wt 160 lb (72.6 kg)   SpO2 98%   BMI 26.63 kg/m²       The 10-year ASCVD risk score (Lonza Vlaerie, et al., 2013) is: 2.2%    Values used to calculate the score:      Age: 64 years      Sex: Female      Is Non- : No      Diabetic: No      Tobacco smoker: No      Systolic Blood Pressure: 891 mmHg      Is BP treated: No      HDL Cholesterol: 50 mg/dL      Total Cholesterol: 244 mg/dL

## 2021-11-15 NOTE — PROGRESS NOTES
Gissel Edwards   1965, 64 y.o. female   [id number]       Referring Provider: Florence Ho DO  Referral Type: In an order in [address] [place] [address]    Reason for Visit: Evaluation of the cause of disorders of balance. ADULT AUDIOLOGIC EVALUATION      Gissel Edwards is a 64 y.o. female seen today, 11/16/2021 , for an initial audiologic evaluation. Patient was seen by Florence Ho DO following today's evaluation. AUDIOLOGIC AND OTHER PERTINENT MEDICAL HISTORY:      Gissel Edwards noted episodic dizziness that started August 2021 lasting seconds to minutes at a time. She notes dizziness improved however did not completely resolve after Epley maneuver performed by chiropractor. Patient reports ear pressure, bilaterally following dizziness onset. She also notes family history of hearing loss in mother. No additional significant otologic or medical history was reported. Gissel Edwards denied otalgia, otorrhea, tinnitus, imbalance, history of falls, history of occupational/recreational noise exposure, history of head trauma and history of ear surgery. Date: 11/16/2021     IMPRESSIONS:      Today's results revealed hearing within normal limits. with excellent word recognition, bilaterally. Follow medical recommendations of Florence Ho DO.     ASSESSMENT AND FINDINGS:     Otoscopy revealed: Clear ear canals bilaterally    RIGHT EAR:  Hearing Sensitivity:Hearing sensitivity within normal limits from 250-8000 Hz  Speech Recognition Threshold: 5 dB HL  Word Recognition: Excellent (96), based on NU-6 25-word list at 50 dBHL using recorded speech stimuli. Tympanometry: Normal peak pressure and compliance, Type A tympanogram, consistent with normal middle ear function. LEFT EAR:  Hearing Sensitivity:Hearing sensitivity within normal limits from 250-8000 Hz  Speech Recognition Threshold: 15 dB HL  Word Recognition: Excellent (96%), based on NU-6 25-word list at 50 dBHL using recorded speech stimuli.     Tympanometry: Normal peak pressure and compliance, Type A tympanogram, consistent with normal middle ear function. Reliability: Good   Transducer: Inserts    See scanned audiogram dated 11/16/2021  for results. PATIENT EDUCATION:       The following items were discussed with the patient:   - Good Communication Strategies  - Dizziness    Educational information was shared in the After Visit Summary. RECOMMENDATIONS:                                                                                                                                                                                                                                                            The following items are recommended based on patient report and results from today's appointment:   - Continue medical follow-up with Staci Dunn DO.   - Retest hearing as medically indicated and/or sooner if a change in hearing is noted. - Utilize \"Good Communication Strategies\" as discussed to assist in speech understanding with communication partners. - If medically indicated, consider vestibular evaluation to further investigate symptoms of dizziness.        Pattie Grullon  Audiologist    Chart CC'd to: Staci Dunn DO      Degree of   Hearing Sensitivity dB Range   Within Normal Limits (WNL) 0 - 20   Mild 20 - 40   Moderate 40 - 55   Moderately-Severe 55 - 70   Severe 70 - 90   Profound 90 +

## 2021-11-16 ENCOUNTER — OFFICE VISIT (OUTPATIENT)
Dept: ENT CLINIC | Age: 56
End: 2021-11-16
Payer: COMMERCIAL

## 2021-11-16 ENCOUNTER — PROCEDURE VISIT (OUTPATIENT)
Dept: AUDIOLOGY | Age: 56
End: 2021-11-16
Payer: COMMERCIAL

## 2021-11-16 VITALS
HEIGHT: 65 IN | HEART RATE: 85 BPM | TEMPERATURE: 98.2 F | SYSTOLIC BLOOD PRESSURE: 139 MMHG | BODY MASS INDEX: 26.66 KG/M2 | WEIGHT: 160 LBS | DIASTOLIC BLOOD PRESSURE: 99 MMHG

## 2021-11-16 DIAGNOSIS — H93.8X3 EAR PRESSURE, BILATERAL: ICD-10-CM

## 2021-11-16 DIAGNOSIS — F45.8 BRUXISM: ICD-10-CM

## 2021-11-16 DIAGNOSIS — R42 DIZZINESS: Primary | ICD-10-CM

## 2021-11-16 DIAGNOSIS — H81.10 BENIGN PAROXYSMAL POSITIONAL VERTIGO, UNSPECIFIED LATERALITY: ICD-10-CM

## 2021-11-16 DIAGNOSIS — H93.8X3 PRESSURE SENSATION IN BOTH EARS: Primary | ICD-10-CM

## 2021-11-16 PROCEDURE — 92567 TYMPANOMETRY: CPT | Performed by: AUDIOLOGIST

## 2021-11-16 PROCEDURE — 99203 OFFICE O/P NEW LOW 30 MIN: CPT | Performed by: OTOLARYNGOLOGY

## 2021-11-16 PROCEDURE — 92557 COMPREHENSIVE HEARING TEST: CPT | Performed by: AUDIOLOGIST

## 2021-11-16 ASSESSMENT — ENCOUNTER SYMPTOMS
SORE THROAT: 0
SINUS PRESSURE: 0
COUGH: 0
EYE ITCHING: 0
VOICE CHANGE: 0
SHORTNESS OF BREATH: 0
APNEA: 0
TROUBLE SWALLOWING: 0
FACIAL SWELLING: 0

## 2021-11-16 NOTE — PROGRESS NOTES
[facility], [address], [address], [facility] Edis Ruvalcaba  P: [phone]       Patient     Mary Rogers  1965    ChiefComplaint     Chief Complaint   Patient presents with    New Patient     Bilateral ear pressure and vertigo       History of Present Illness     Mami Loss 68-year-old female here today for evaluation of bilateral ear fullness and sensation of near dizziness. She reports being diagnosed and treated for benign positional vertigo, treated with Epley by chiropractor. This resolved all of her room spinning vertigo. Since that time she had some bilateral ear pressure and fullness sensation with the potential for developing dizziness. Seen by PCP and started on Flonase which resolved all symptoms. She does have a bite guard is been unable to wear as she is to recently had a chipped tooth that was fixed in her record does not fit correctly. Reports hearing is normal.  No history of seasonal allergies.     Past Medical History     Past Medical History:   Diagnosis Date    Bulimia     history of bulimia in high school    Family history of colon cancer     Cscope q 3 yrs    History of bulimia     high school    HLD (hyperlipidemia)     Hypothyroid 01/2015    Dr Bertrand Tucker, endocrinologist    Pituitary microadenoma     Lifelong bromocriptine was planned though pt was able to d/c it 2015 w/o recurrence of prolactinoma       Past Surgical History     Past Surgical History:   Procedure Laterality Date    COLONOSCOPY  5/21/10    3 years    COLONOSCOPY  07/07/2017    LAPAROSCOPY      endomitriosis    TONSILLECTOMY      at age 21       Family History     Family History   Problem Relation Age of Onset    Colon Cancer Mother         dx at age 48   Terri Martins Cancer Mother 48        colon    Diabetes Father     High Blood Pressure Father     Heart Disease Paternal Grandfather     Cancer Maternal Grandfather         colon cancer age 66    Cancer Other         maternal aunt, colon cancer age 47       Social History     Social History     Tobacco Use    Smoking status: Never Smoker    Smokeless tobacco: Never Used   Substance Use Topics    Alcohol use: No    Drug use: No        Allergies     No Known Allergies    Medications     Current Outpatient Medications   Medication Sig Dispense Refill    fluticasone (FLONASE) 50 MCG/ACT nasal spray 2 sprays by Each Nostril route daily 16 g 5    hydrOXYzine (ATARAX) 25 MG tablet TAKE 1 TABLET BY MOUTH NIGHTLY AS NEEDED FOR INSOMNIA. 90 tablet 3    levothyroxine (SYNTHROID) 88 MCG tablet Take 88 mcg by mouth daily      Omega-3 Fatty Acids (FISH OIL PO) Take by mouth      Levonorgest-Eth Estrad 91-Day 0.15-0.03 &0.01 MG TABS Take 1 tablet by mouth daily.  calcium carbonate (OSCAL) 500 MG TABS tablet Take 500 mg by mouth daily.  Multiple Vitamin (MULTIVITAMIN PO) Take  by mouth. No current facility-administered medications for this visit. Review of Systems     Review of Systems   Constitutional: Negative for appetite change, chills, fatigue, fever and unexpected weight change. HENT: Negative for congestion, ear discharge, ear pain, facial swelling, hearing loss, nosebleeds, postnasal drip, sinus pressure, sneezing, sore throat, tinnitus, trouble swallowing and voice change. Eyes: Negative for itching. Respiratory: Negative for apnea, cough and shortness of breath. Endocrine: Negative for cold intolerance and heat intolerance. Musculoskeletal: Negative for myalgias and neck pain. Skin: Negative for rash. Allergic/Immunologic: Negative for environmental allergies. Neurological: Positive for dizziness. Negative for headaches. Psychiatric/Behavioral: Negative for confusion, decreased concentration and sleep disturbance.          PhysicalExam     Vitals:    11/16/21 1015 11/16/21 1017   BP: (!) 139/92 (!) 139/99   Site: Right Upper Arm Left Upper Arm   Position: Sitting Sitting   Pulse: 85    Temp: 98.2 °F (36.8 °C)    Weight: 160 lb (72.6 kg)    Height: 5' 5\" (1.651 m)        Physical Exam  Constitutional:       General: She is not in acute distress. Appearance: She is well-developed. HENT:      Head: Normocephalic and atraumatic. Right Ear: Tympanic membrane, ear canal and external ear normal. No drainage. No middle ear effusion. Tympanic membrane is not bulging. Tympanic membrane has normal mobility. Left Ear: Tympanic membrane, ear canal and external ear normal. No drainage. No middle ear effusion. Tympanic membrane is not bulging. Tympanic membrane has normal mobility. Nose: No mucosal edema or rhinorrhea. Mouth/Throat:      Lips: Pink. Mouth: Mucous membranes are moist.      Tongue: No lesions. Palate: No mass. Pharynx: Uvula midline. Eyes:      Pupils: Pupils are equal, round, and reactive to light. Neck:      Thyroid: No thyroid mass or thyromegaly. Trachea: Trachea and phonation normal.   Cardiovascular:      Pulses: Normal pulses. Pulmonary:      Effort: Pulmonary effort is normal. No accessory muscle usage or respiratory distress. Breath sounds: No stridor. Musculoskeletal:      Cervical back: Full passive range of motion without pain. Lymphadenopathy:      Head:      Right side of head: No submental or submandibular adenopathy. Left side of head: No submental or submandibular adenopathy. Cervical: No cervical adenopathy. Right cervical: No superficial, deep or posterior cervical adenopathy. Left cervical: No superficial, deep or posterior cervical adenopathy. Skin:     General: Skin is warm and dry. Neurological:      Mental Status: She is alert and oriented to person, place, and time. Cranial Nerves: No cranial nerve deficit. Coordination: Coordination normal.      Gait: Gait normal.      Comments: Negative [place]pike bilaterally   Psychiatric:         Thought Content:  Thought content normal.             Assessment and Plan     1. Pressure sensation in both ears  -Provided reassurance today that her ears are of normal appearance and on testing are functioning perfectly  -Discussed that if symptoms improved with Flonase there is likely component of eustachian tube dysfunction. She can continue using Flonase 2 sprays each side daily to aid in symptoms. We also discussed that ear pressure can be secondary to inflammation of the jaw joint. She has not been using her bite guard for the last several months due to a chipped tooth  - Marquita Martinez [place]. D., Audiology, [facility]    2. Bruxism  -Known clenching and grinding at night for which she has a bicarb has not been using  -Reports feeling tightness in her jaw since stopping using her bite guard  -This is likely contributing to the fullness sensation in the ears    3. Benign paroxysmal positional vertigo, unspecified laterality  -Treated successfully by chiropractor negative Nadeen-Hallpike today      Return as needed    Gela Natarajan DO  11/16/21      Portions of this note were dictated using Dragon.  There may be linguistic errors secondary to the use of this program.

## 2021-11-16 NOTE — PATIENT INSTRUCTIONS
Good Communication Strategies    Communication can be challenging for anyone, but can be especially difficult for those with some degree of hearing loss. While we may not be able to control every factor that may lead to difficulty with communication, there are Good Communication Strategies that we can all use in our day-to-day lives. Communication takes both parties working together for it to be successful. Tips as a Listener:   1. Control your environment. It is important to limit the amount of background noise in the room when possible. You should also consider having a good light source in the room to best see the other person. 2. Ask for clarification. Instead of saying \"What?\", you can use parts of what you heard to make a new question. For example, if you heard the word \"Thursday\" but not the rest of the week, you may ask \"What was that about Thursday? \" or \"What did you want to do Thursday? \". This shows the person talking that you are listening and will help them better explain what they are saying. 3. Be an advocate for yourself. If you are hearing but not understanding, tell the other person \"I can hear you, but I need you to slow down when you speak. \"  Or if someone is facing the other direction, say \"I cannot hear you when you are not looking at me when we talk. \"       Tips as a Talker:   - Sit or stand 3 to 6 feet away to maximize audibility         -- It is unrealistic to believe someone else will fully hear your message if you are speaking from across the room or in a different room in the house   - Stay at eye level to help with visual cues   - Make sure you have the persons attention before speaking   - Use facial expressions and gestures to accentuate your message   - Raise your voice slightly (do not scream)   - Speak slowly and distinctly   - Use short, simple sentences   - Rephrase your words if the person is having a hard time understanding you    - To avoid distortion, dont speak directly into a persons ear      Some additional items that may be helpful:   - Remain patient - this is important for both parties   - Write down items that still cannot be heard/understood. You may write with pen/paper or consider typing/texting on a cell phone or smart device. - If background noise is unavoidable, try to keep yourself in a good position in the room. By sitting at a landon on the side of the restaurant (preferably a corner), it will be easier to communicate than if you were sitting at a table in the middle with background noise surrounding you. Keep yourself positioned away from music speakers or heavy foot traffic. Dizziness: Care Instructions  Your Care Instructions  Dizziness is the feeling of unsteadiness or fuzziness in your head. It is different than having vertigo, which is a feeling that the room is spinning or that you are moving or falling. It is also different from lightheadedness, which is the feeling that you are about to faint. It can be hard to know what causes dizziness. Some people feel dizzy when they have migraine headaches. Sometimes bouts of flu can make you feel dizzy. Some medical conditions, such as heart problems or high blood pressure, can make you feel dizzy. Many medicines can cause dizziness, including medicines for high blood pressure, pain, or anxiety. If a medicine causes your symptoms, your doctor may recommend that you stop or change the medicine. If it is a problem with your heart, you may need medicine to help your heart work better. If there is no clear reason for your symptoms, your doctor may suggest watching and waiting for a while to see if the dizziness goes away on its own. Follow-up care is a key part of your treatment and safety. Be sure to make and go to all appointments, and call your doctor if you are having problems. It's also a good idea to know your test results and keep a list of the medicines you take.     How can and sign in to your [employer] account. Enter O625 in the D1G box to learn more about \"Dizziness: Care Instructions. \"     If you do not have an account, please click on the \"Sign Up Now\" link. Current as of: September 23, 2018  Content Version: 11.9  © 7574-4190 [employer]. Care instructions adapted under license by [facility] ([facility]). If you have questions about a medical condition or this instruction, always ask your healthcare professional. [address] any warranty or liability for your use of this information.

## 2021-11-16 NOTE — Clinical Note
Dr. Gm Barajas,    Thank you for your referral for audiometric testing on this patient. Please see the scanned audiogram (under \"Media\" tab) and encounter note for details. If you have any questions, or if there is anything else you need, please let me know.       Yadi Harrison  Audiologist  ---  [id number] [facility] - Audiology

## 2021-11-30 DIAGNOSIS — H93.8X3 EAR PRESSURE, BILATERAL: ICD-10-CM

## 2021-11-30 DIAGNOSIS — R42 VERTIGO: ICD-10-CM

## 2021-11-30 RX ORDER — FLUTICASONE PROPIONATE 50 MCG
SPRAY, SUSPENSION (ML) NASAL
Qty: 16 G | Refills: 5 | Status: SHIPPED | OUTPATIENT
Start: 2021-11-30

## 2021-12-20 ENCOUNTER — TELEPHONE (OUTPATIENT)
Dept: FAMILY MEDICINE CLINIC | Age: 56
End: 2021-12-20

## 2021-12-20 ENCOUNTER — PATIENT MESSAGE (OUTPATIENT)
Dept: FAMILY MEDICINE CLINIC | Age: 56
End: 2021-12-20

## 2021-12-20 NOTE — TELEPHONE ENCOUNTER
----- Message from Gabriel Enrique sent at 12/20/2021  9:21 AM EST -----  Subject: Appointment Request    Reason for Call: Routine (Patient Request) No Script    QUESTIONS  Type of Appointment? Established Patient  Reason for appointment request? Available appointments did not meet   patient need  Additional Information for Provider? Pt has appt for 12/29 but would like   to be seen sooner. She has swollen lymph nodes under both arms and would   also like to discuss blood work.   ---------------------------------------------------------------------------  --------------  [address]  What is the best way for the office to contact you? OK to leave message on   voicemail  Preferred Call Back Phone Number? [phone]  ---------------------------------------------------------------------------  --------------  SCRIPT ANSWERS  Relationship to Patient? Self  (Is the patient requesting to see the provider for a procedure?)? No  (Is the patient requesting to see the provider urgently  today or   tomorrow. )? No  Have you been diagnosed with, awaiting test results for, or told that you   are suspected of having COVID-19 (Coronavirus)? (If patient has tested   negative or was tested as a requirement for work, school, or travel and   not based on symptoms, answer no)? No  Within the past two weeks have you developed any of the following symptoms   (answer no if symptoms have been present longer than 2 weeks or began   more than 2 weeks ago)? Fever or Chills, Cough, Shortness of breath or   difficulty breathing, Loss of taste or smell, Sore throat, Nasal   congestion, Sneezing or runny nose, Fatigue or generalized body aches   (answer no if pain is specific to a body part e.g. back pain), Diarrhea,   Headache? No  Have you had close contact with someone with COVID-19 in the last 14 days? No  (Service Expert  click yes below to proceed with [employer] As Usual   Scheduling)?  Yes

## 2021-12-21 ENCOUNTER — OFFICE VISIT (OUTPATIENT)
Dept: FAMILY MEDICINE CLINIC | Age: 56
End: 2021-12-21
Payer: COMMERCIAL

## 2021-12-21 VITALS
WEIGHT: 161.2 LBS | DIASTOLIC BLOOD PRESSURE: 84 MMHG | HEART RATE: 83 BPM | HEIGHT: 65 IN | SYSTOLIC BLOOD PRESSURE: 136 MMHG | OXYGEN SATURATION: 100 % | BODY MASS INDEX: 26.86 KG/M2

## 2021-12-21 DIAGNOSIS — R60.9 EDEMA, UNSPECIFIED TYPE: ICD-10-CM

## 2021-12-21 DIAGNOSIS — R59.1 LYMPHADENOPATHY: Primary | ICD-10-CM

## 2021-12-21 LAB
A/G RATIO: 1.6 (ref 1.1–2.2)
ALBUMIN SERPL-MCNC: 4.2 G/DL (ref 3.4–5)
ALP BLD-CCNC: 61 U/L (ref 40–129)
ALT SERPL-CCNC: 14 U/L (ref 10–40)
ANION GAP SERPL CALCULATED.3IONS-SCNC: 12 MMOL/L (ref 3–16)
AST SERPL-CCNC: 13 U/L (ref 15–37)
BASOPHILS ABSOLUTE: 0.1 K/UL (ref 0–0.2)
BASOPHILS RELATIVE PERCENT: 1.1 %
BILIRUB SERPL-MCNC: <0.2 MG/DL (ref 0–1)
BUN BLDV-MCNC: 12 MG/DL (ref 7–20)
CALCIUM SERPL-MCNC: 8.9 MG/DL (ref 8.3–10.6)
CHLORIDE BLD-SCNC: 102 MMOL/L (ref 99–110)
CO2: 23 MMOL/L (ref 21–32)
CREAT SERPL-MCNC: 0.6 MG/DL (ref 0.6–1.1)
EOSINOPHILS ABSOLUTE: 0 K/UL (ref 0–0.6)
EOSINOPHILS RELATIVE PERCENT: 0.5 %
GFR AFRICAN AMERICAN: >60
GFR NON-AFRICAN AMERICAN: >60
GLUCOSE BLD-MCNC: 93 MG/DL (ref 70–99)
HCT VFR BLD CALC: 35.9 % (ref 36–48)
HEMOGLOBIN: 12 G/DL (ref 12–16)
LYMPHOCYTES ABSOLUTE: 2.8 K/UL (ref 1–5.1)
LYMPHOCYTES RELATIVE PERCENT: 37.6 %
MCH RBC QN AUTO: 30.4 PG (ref 26–34)
MCHC RBC AUTO-ENTMCNC: 33.3 G/DL (ref 31–36)
MCV RBC AUTO: 91.3 FL (ref 80–100)
MONOCYTES ABSOLUTE: 0.5 K/UL (ref 0–1.3)
MONOCYTES RELATIVE PERCENT: 6.9 %
NEUTROPHILS ABSOLUTE: 4.1 K/UL (ref 1.7–7.7)
NEUTROPHILS RELATIVE PERCENT: 53.9 %
PDW BLD-RTO: 13 % (ref 12.4–15.4)
PLATELET # BLD: 391 K/UL (ref 135–450)
PMV BLD AUTO: 8.6 FL (ref 5–10.5)
POTASSIUM SERPL-SCNC: 4.3 MMOL/L (ref 3.5–5.1)
RBC # BLD: 3.94 M/UL (ref 4–5.2)
SODIUM BLD-SCNC: 137 MMOL/L (ref 136–145)
TOTAL PROTEIN: 6.9 G/DL (ref 6.4–8.2)
WBC # BLD: 7.5 K/UL (ref 4–11)

## 2021-12-21 PROCEDURE — 36415 COLL VENOUS BLD VENIPUNCTURE: CPT | Performed by: REGISTERED NURSE

## 2021-12-21 PROCEDURE — 99213 OFFICE O/P EST LOW 20 MIN: CPT | Performed by: REGISTERED NURSE

## 2021-12-21 ASSESSMENT — ENCOUNTER SYMPTOMS
GASTROINTESTINAL NEGATIVE: 1
SHORTNESS OF BREATH: 0
EYES NEGATIVE: 1

## 2021-12-21 NOTE — TELEPHONE ENCOUNTER
Pls tell her that if she is in distress or worried, she might want to consider ucc. I apologize that I'm on vacation and unable to see her.

## 2021-12-21 NOTE — PROGRESS NOTES
Patient: Gerard Dupree is a 64 y.o. female who presents today with the following Chief Complaint(s):  Chief Complaint   Patient presents with    Lymphadenopathy     Underarms       Assessment/Plan:    Blood work drawn in office to rule out underlying etiology. Areas around neck are consistent with lipomas, sent for ultrasound for further evaluation and characterization. Nodules in bilateral axilla consistent with enlarged lymph nodes. Sent for ultrasound for further evaluation diagnosis. 1. Lymphadenopathy    - CBC Auto Differential  - Comprehensive Metabolic Panel  - US SOFT TISSUE LIMITED AREA; Future    2. Edema, unspecified type    - US HEAD NECK SOFT TISSUE THYROID; Future        Return if symptoms worsen or fail to improve. HPI:     She has had some swelling under both armpits since 12/5/21. They feel like \"little peas\". She has also noticed some swelling around her collar bones. These do not hurt. She does feel that they are getting better and they do not feel as tender. She just had her mammogram completed recently and this was negative. She has not had a period in some time due to her birth control. Current Outpatient Medications   Medication Sig Dispense Refill    fluticasone (FLONASE) 50 MCG/ACT nasal spray SPRAY 2 SPRAYS INTO EACH NOSTRIL EVERY DAY 16 g 5    hydrOXYzine (ATARAX) 25 MG tablet TAKE 1 TABLET BY MOUTH NIGHTLY AS NEEDED FOR INSOMNIA. 90 tablet 3    levothyroxine (SYNTHROID) 88 MCG tablet Take 88 mcg by mouth daily      Omega-3 Fatty Acids (FISH OIL PO) Take by mouth      Levonorgest-Eth Estrad 91-Day 0.15-0.03 &0.01 MG TABS Take 1 tablet by mouth daily.  calcium carbonate (OSCAL) 500 MG TABS tablet Take 500 mg by mouth daily.  Multiple Vitamin (MULTIVITAMIN PO) Take  by mouth. No current facility-administered medications for this visit. Review of Systems   Constitutional: Negative for fatigue and fever. Eyes: Negative.     Respiratory:

## 2021-12-23 ENCOUNTER — HOSPITAL ENCOUNTER (OUTPATIENT)
Dept: ULTRASOUND IMAGING | Age: 56
Discharge: HOME | End: 2021-12-23
Payer: COMMERCIAL

## 2021-12-23 DIAGNOSIS — R60.9 EDEMA, UNSPECIFIED TYPE: ICD-10-CM

## 2021-12-23 DIAGNOSIS — R59.1 LYMPHADENOPATHY: ICD-10-CM

## 2021-12-23 PROCEDURE — 76536 US EXAM OF HEAD AND NECK: CPT

## 2021-12-23 PROCEDURE — 76999 ECHO EXAMINATION PROCEDURE: CPT

## 2022-05-19 DIAGNOSIS — H93.8X3 EAR PRESSURE, BILATERAL: ICD-10-CM

## 2022-05-19 DIAGNOSIS — R42 VERTIGO: ICD-10-CM

## 2022-05-19 RX ORDER — MECLIZINE HCL 12.5 MG/1
TABLET ORAL
Qty: 30 TABLET | Refills: 0 | Status: SHIPPED | OUTPATIENT
Start: 2022-05-19

## 2022-07-08 ENCOUNTER — HOSPITAL ENCOUNTER (OUTPATIENT)
Dept: WOMENS IMAGING | Age: 57
Discharge: HOME | End: 2022-07-08
Payer: COMMERCIAL

## 2022-07-08 DIAGNOSIS — Z12.31 ENCOUNTER FOR SCREENING MAMMOGRAM FOR BREAST CANCER: ICD-10-CM

## 2022-07-08 PROCEDURE — 77063 BREAST TOMOSYNTHESIS BI: CPT

## 2022-10-03 RX ORDER — HYDROXYZINE HYDROCHLORIDE 25 MG/1
TABLET, FILM COATED ORAL
Qty: 90 TABLET | Refills: 3 | Status: SHIPPED | OUTPATIENT
Start: 2022-10-03

## 2022-10-23 NOTE — PROGRESS NOTES
Assessment/Plan:    Louie Ferguson was seen today for annual exam.    Diagnoses and all orders for this visit:    Annual physical exam  -     Lipid Panel; Future  -     Comprehensive Metabolic Panel; Future    Reactive lymphadenopathy since covid vaccine  -     CBC with Auto Differential; Future   Lad is intermittent, had neg u/s at time when pt was experienced axillary fullness. Cont to monitor. Do not suspect pathology. Mammo 7/8/22 nl. Acquired hypothyroidism, Pituitary microadenoma [facility])   Per Dr Neeraj Houston, doing well. There are no Patient Instructions on file for this visit. Patient: Paige Diaz is a 62 y. o.female who presents today with the following Chief Complaint(s):  Chief Complaint   Patient presents with    Annual Exam         HPI: Pt with hx of bulemia, pit microadenoma s/p bromocriptine x 10 yr and hypothyroidism conts to see Dr Neeraj Houston, endo. No nipple d/c or visual distortion. Donates blood q 12 wk on avg, is A-. Does exercise classes tiw, cleans 6 houses per wk, is very active. Does class 4 x wk at [employer], also does does wt training. Gyn has pt cont ocp to lower risk of female ca. Has menses q 3 mo with Seasonique. Pt reports recent pap showed abnl cells. Pt was told to repeat pap in 6 mo's. Sleeps well with hydroxyzine. Had covid booster 12/13/21. Then noted lumps under b/l axilla. Was sent for u/s, neg. Since, notes cycles of b/l axillary knots. At same time, may notice skin lesion on either arm or pubic area. LN swelling lasts 1-2 wk, may recurr q 2 mo. Crew neck shirt pushes against ant neck fullness, which pt wonders if it is lipoma. Conts q 3-5 yr cscope as M and MFG had colon ca. SBP was 140s in July 2022 when was moving mom with AD into [place]. BP was high at dentist, since has taken CoQ10 qd x 1 mo.        Current Outpatient Medications   Medication Sig Dispense Refill    hydrOXYzine HCl (ATARAX) 25 MG tablet TAKE 1 TABLET BY MOUTH NIGHTLY AS NEEDED FOR INSOMNIA. 90 tablet 3    meclizine (ANTIVERT) 12.5 MG tablet TAKE 1 TABLET BY MOUTH 3 TIMES A DAY AS NEEDED FOR DIZZINESS 30 tablet 0    fluticasone (FLONASE) 50 MCG/ACT nasal spray SPRAY 2 SPRAYS INTO EACH NOSTRIL EVERY DAY 16 g 5    levothyroxine (SYNTHROID) 88 MCG tablet Take 88 mcg by mouth daily      Omega-3 Fatty Acids (FISH OIL PO) Take by mouth      Levonorgest-Eth Estrad 91-Day 0.15-0.03 &0.01 MG TABS Take 1 tablet by mouth daily. calcium carbonate (OSCAL) 500 MG TABS tablet Take 500 mg by mouth daily. Multiple Vitamin (MULTIVITAMIN PO) Take  by mouth. No current facility-administered medications for this visit. Patient's past medical history,surgical history, family history, medications,  and allergies  were all reviewed and updated as appropriate today. Review of Systems      Physical Exam  Constitutional:       General: She is not in acute distress. Appearance: Normal appearance. She is well-developed. She is not ill-appearing. HENT:      Head: Normocephalic and atraumatic. Right Ear: Tympanic membrane, ear canal and external ear normal.      Left Ear: Tympanic membrane, ear canal and external ear normal.      Mouth/Throat:      Pharynx: Oropharynx is clear. No oropharyngeal exudate. Eyes:      General: No scleral icterus. Right eye: No discharge. Left eye: No discharge. Extraocular Movements: Extraocular movements intact. Conjunctiva/sclera: Conjunctivae normal.   Neck:      Vascular: No carotid bruit. Comments: Neg TMG  Cardiovascular:      Rate and Rhythm: Normal rate and regular rhythm. Pulses: Normal pulses. Heart sounds: Normal heart sounds. No murmur heard. Pulmonary:      Effort: Pulmonary effort is normal. No respiratory distress. Breath sounds: Normal breath sounds. Chest:      Comments: B/L axilla w/o LAD, mass or ttp    Abdominal:      General: Bowel sounds are normal. There is no distension. Palpations: Abdomen is soft. There is no mass. Tenderness: There is no abdominal tenderness. Musculoskeletal:         General: Normal range of motion. Cervical back: Normal range of motion and neck supple. Right lower leg: No edema. Left lower leg: No edema. Lymphadenopathy:      Cervical: No cervical adenopathy. Skin:     General: Skin is warm and dry. Capillary Refill: Capillary refill takes less than 2 seconds. Coloration: Skin is not jaundiced or pale. Findings: No rash. Comments: B/l axilla nl, no masses, NT   Neurological:      General: No focal deficit present. Mental Status: She is alert and oriented to person, place, and time. Cranial Nerves: No cranial nerve deficit. Deep Tendon Reflexes: Reflexes are normal and symmetric. Psychiatric:         Mood and Affect: Mood normal.         Behavior: Behavior normal.         Thought Content:  Thought content normal.         Judgment: Judgment normal.         /82   Pulse 69   Ht 5' 5\" (1.651 m)   Wt 158 lb (71.7 kg)   SpO2 99%   BMI 26.29 kg/m²

## 2022-10-24 ENCOUNTER — OFFICE VISIT (OUTPATIENT)
Dept: FAMILY MEDICINE CLINIC | Age: 57
End: 2022-10-24
Payer: COMMERCIAL

## 2022-10-24 VITALS
HEART RATE: 69 BPM | OXYGEN SATURATION: 99 % | SYSTOLIC BLOOD PRESSURE: 134 MMHG | BODY MASS INDEX: 26.33 KG/M2 | HEIGHT: 65 IN | WEIGHT: 158 LBS | DIASTOLIC BLOOD PRESSURE: 82 MMHG

## 2022-10-24 DIAGNOSIS — E03.9 ACQUIRED HYPOTHYROIDISM: ICD-10-CM

## 2022-10-24 DIAGNOSIS — D35.2 PITUITARY MICROADENOMA (HCC): ICD-10-CM

## 2022-10-24 DIAGNOSIS — Z00.00 ANNUAL PHYSICAL EXAM: ICD-10-CM

## 2022-10-24 DIAGNOSIS — Z00.00 ANNUAL PHYSICAL EXAM: Primary | ICD-10-CM

## 2022-10-24 DIAGNOSIS — R59.9 REACTIVE LYMPHADENOPATHY: ICD-10-CM

## 2022-10-24 LAB
A/G RATIO: 1.6 (ref 1.1–2.2)
ALBUMIN SERPL-MCNC: 4.6 G/DL (ref 3.4–5)
ALP BLD-CCNC: 64 U/L (ref 40–129)
ALT SERPL-CCNC: 13 U/L (ref 10–40)
ANION GAP SERPL CALCULATED.3IONS-SCNC: 13 MMOL/L (ref 3–16)
AST SERPL-CCNC: 17 U/L (ref 15–37)
BASOPHILS ABSOLUTE: 0.1 K/UL (ref 0–0.2)
BASOPHILS RELATIVE PERCENT: 1 %
BILIRUB SERPL-MCNC: 0.5 MG/DL (ref 0–1)
BUN BLDV-MCNC: 12 MG/DL (ref 7–20)
CALCIUM SERPL-MCNC: 9.5 MG/DL (ref 8.3–10.6)
CHLORIDE BLD-SCNC: 103 MMOL/L (ref 99–110)
CHOLESTEROL, TOTAL: 260 MG/DL (ref 0–199)
CO2: 23 MMOL/L (ref 21–32)
CREAT SERPL-MCNC: 0.6 MG/DL (ref 0.6–1.1)
EOSINOPHILS ABSOLUTE: 0.1 K/UL (ref 0–0.6)
EOSINOPHILS RELATIVE PERCENT: 1.1 %
GFR SERPL CREATININE-BSD FRML MDRD: >60 ML/MIN/{1.73_M2}
GLUCOSE BLD-MCNC: 84 MG/DL (ref 70–99)
HCT VFR BLD CALC: 40.5 % (ref 36–48)
HDLC SERPL-MCNC: 46 MG/DL (ref 40–60)
HEMOGLOBIN: 13.7 G/DL (ref 12–16)
LDL CHOLESTEROL CALCULATED: 159 MG/DL
LYMPHOCYTES ABSOLUTE: 2.6 K/UL (ref 1–5.1)
LYMPHOCYTES RELATIVE PERCENT: 35.8 %
MCH RBC QN AUTO: 31.1 PG (ref 26–34)
MCHC RBC AUTO-ENTMCNC: 33.9 G/DL (ref 31–36)
MCV RBC AUTO: 91.7 FL (ref 80–100)
MONOCYTES ABSOLUTE: 0.5 K/UL (ref 0–1.3)
MONOCYTES RELATIVE PERCENT: 6.8 %
NEUTROPHILS ABSOLUTE: 3.9 K/UL (ref 1.7–7.7)
NEUTROPHILS RELATIVE PERCENT: 55.3 %
PDW BLD-RTO: 13.5 % (ref 12.4–15.4)
PLATELET # BLD: 333 K/UL (ref 135–450)
PMV BLD AUTO: 9 FL (ref 5–10.5)
POTASSIUM SERPL-SCNC: 5.2 MMOL/L (ref 3.5–5.1)
RBC # BLD: 4.41 M/UL (ref 4–5.2)
SODIUM BLD-SCNC: 139 MMOL/L (ref 136–145)
TOTAL PROTEIN: 7.4 G/DL (ref 6.4–8.2)
TRIGL SERPL-MCNC: 275 MG/DL (ref 0–150)
VLDLC SERPL CALC-MCNC: 55 MG/DL
WBC # BLD: 7.2 K/UL (ref 4–11)

## 2022-10-24 PROCEDURE — 99396 PREV VISIT EST AGE 40-64: CPT | Performed by: FAMILY MEDICINE

## 2022-10-24 ASSESSMENT — PATIENT HEALTH QUESTIONNAIRE - PHQ9
SUM OF ALL RESPONSES TO PHQ QUESTIONS 1-9: 0
SUM OF ALL RESPONSES TO PHQ9 QUESTIONS 1 & 2: 0
1. LITTLE INTEREST OR PLEASURE IN DOING THINGS: 0
SUM OF ALL RESPONSES TO PHQ QUESTIONS 1-9: 0
2. FEELING DOWN, DEPRESSED OR HOPELESS: 0

## 2023-04-30 ENCOUNTER — HOSPITAL ENCOUNTER (EMERGENCY)
Age: 58
Discharge: HOME | End: 2023-04-30
Attending: EMERGENCY MEDICINE
Payer: COMMERCIAL

## 2023-04-30 VITALS
OXYGEN SATURATION: 98 % | HEIGHT: 65 IN | BODY MASS INDEX: 25.83 KG/M2 | WEIGHT: 155 LBS | SYSTOLIC BLOOD PRESSURE: 135 MMHG | RESPIRATION RATE: 17 BRPM | DIASTOLIC BLOOD PRESSURE: 101 MMHG | HEART RATE: 95 BPM | TEMPERATURE: 97.8 F

## 2023-04-30 DIAGNOSIS — I47.1 SVT (SUPRAVENTRICULAR TACHYCARDIA) (HCC): Primary | ICD-10-CM

## 2023-04-30 LAB
ALBUMIN SERPL-MCNC: 4.1 G/DL (ref 3.4–5)
ALBUMIN/GLOB SERPL: 1.3 {RATIO} (ref 1.1–2.2)
ALP SERPL-CCNC: 67 U/L (ref 40–129)
ALT SERPL-CCNC: 16 U/L (ref 10–40)
ANION GAP SERPL CALCULATED.3IONS-SCNC: 13 MMOL/L (ref 3–16)
AST SERPL-CCNC: 22 U/L (ref 15–37)
BASOPHILS # BLD: 0.1 K/UL (ref 0–0.2)
BASOPHILS NFR BLD: 0.8 %
BILIRUB SERPL-MCNC: 0.4 MG/DL (ref 0–1)
BUN SERPL-MCNC: 12 MG/DL (ref 7–20)
CALCIUM SERPL-MCNC: 8.9 MG/DL (ref 8.3–10.6)
CHLORIDE SERPL-SCNC: 99 MMOL/L (ref 99–110)
CO2 SERPL-SCNC: 22 MMOL/L (ref 21–32)
CREAT SERPL-MCNC: 0.6 MG/DL (ref 0.6–1.1)
DEPRECATED RDW RBC AUTO: 13.2 % (ref 12.4–15.4)
EKG ATRIAL RATE: 147 BPM
EKG ATRIAL RATE: 93 BPM
EKG DIAGNOSIS: NORMAL
EKG DIAGNOSIS: NORMAL
EKG P AXIS: 67 DEGREES
EKG P-R INTERVAL: 160 MS
EKG Q-T INTERVAL: 274 MS
EKG Q-T INTERVAL: 358 MS
EKG QRS DURATION: 108 MS
EKG QRS DURATION: 72 MS
EKG QTC CALCULATION (BAZETT): 445 MS
EKG QTC CALCULATION (BAZETT): 445 MS
EKG R AXIS: 30 DEGREES
EKG R AXIS: 71 DEGREES
EKG T AXIS: 3 DEGREES
EKG T AXIS: 31 DEGREES
EKG VENTRICULAR RATE: 159 BPM
EKG VENTRICULAR RATE: 93 BPM
EOSINOPHIL # BLD: 0.1 K/UL (ref 0–0.6)
EOSINOPHIL NFR BLD: 1.3 %
GFR SERPLBLD CREATININE-BSD FMLA CKD-EPI: >60 ML/MIN/{1.73_M2}
GLUCOSE SERPL-MCNC: 131 MG/DL (ref 70–99)
HCT VFR BLD AUTO: 38.3 % (ref 36–48)
HGB BLD-MCNC: 12.5 G/DL (ref 12–16)
LYMPHOCYTES # BLD: 2.9 K/UL (ref 1–5.1)
LYMPHOCYTES NFR BLD: 36.1 %
MAGNESIUM SERPL-MCNC: 2 MG/DL (ref 1.8–2.4)
MCH RBC QN AUTO: 30.3 PG (ref 26–34)
MCHC RBC AUTO-ENTMCNC: 32.7 G/DL (ref 31–36)
MCV RBC AUTO: 92.5 FL (ref 80–100)
MONOCYTES # BLD: 0.6 K/UL (ref 0–1.3)
MONOCYTES NFR BLD: 7.9 %
NEUTROPHILS # BLD: 4.3 K/UL (ref 1.7–7.7)
NEUTROPHILS NFR BLD: 53.9 %
PLATELET # BLD AUTO: 399 K/UL (ref 135–450)
PMV BLD AUTO: 9 FL (ref 5–10.5)
POTASSIUM SERPL-SCNC: 4 MMOL/L (ref 3.5–5.1)
PROT SERPL-MCNC: 7.2 G/DL (ref 6.4–8.2)
RBC # BLD AUTO: 4.14 M/UL (ref 4–5.2)
SODIUM SERPL-SCNC: 134 MMOL/L (ref 136–145)
TROPONIN, HIGH SENSITIVITY: 11 NG/L (ref 0–14)
WBC # BLD AUTO: 7.9 K/UL (ref 4–11)

## 2023-04-30 PROCEDURE — 96361 HYDRATE IV INFUSION ADD-ON: CPT

## 2023-04-30 PROCEDURE — 6360000002 HC RX W HCPCS: Performed by: PHYSICIAN ASSISTANT

## 2023-04-30 PROCEDURE — 93010 ELECTROCARDIOGRAM REPORT: CPT | Performed by: INTERNAL MEDICINE

## 2023-04-30 PROCEDURE — 85025 COMPLETE CBC W/AUTO DIFF WBC: CPT

## 2023-04-30 PROCEDURE — 80053 COMPREHEN METABOLIC PANEL: CPT

## 2023-04-30 PROCEDURE — 96374 THER/PROPH/DIAG INJ IV PUSH: CPT

## 2023-04-30 PROCEDURE — 84484 ASSAY OF TROPONIN QUANT: CPT

## 2023-04-30 PROCEDURE — 93005 ELECTROCARDIOGRAM TRACING: CPT | Performed by: EMERGENCY MEDICINE

## 2023-04-30 PROCEDURE — 83735 ASSAY OF MAGNESIUM: CPT

## 2023-04-30 PROCEDURE — 2580000003 HC RX 258: Performed by: PHYSICIAN ASSISTANT

## 2023-04-30 PROCEDURE — 99284 EMERGENCY DEPT VISIT MOD MDM: CPT

## 2023-04-30 RX ORDER — 0.9 % SODIUM CHLORIDE 0.9 %
1000 INTRAVENOUS SOLUTION INTRAVENOUS ONCE
Status: COMPLETED | OUTPATIENT
Start: 2023-04-30 | End: 2023-04-30

## 2023-04-30 RX ORDER — ADENOSINE 3 MG/ML
6 INJECTION, SOLUTION INTRAVENOUS ONCE
Status: COMPLETED | OUTPATIENT
Start: 2023-04-30 | End: 2023-04-30

## 2023-04-30 RX ADMIN — ADENOSINE 6 MG: 3 INJECTION, SOLUTION INTRAVENOUS at 11:09

## 2023-04-30 RX ADMIN — SODIUM CHLORIDE 1000 ML: 9 INJECTION, SOLUTION INTRAVENOUS at 11:11

## 2023-04-30 ASSESSMENT — PAIN - FUNCTIONAL ASSESSMENT
PAIN_FUNCTIONAL_ASSESSMENT: NONE - DENIES PAIN
PAIN_FUNCTIONAL_ASSESSMENT: NONE - DENIES PAIN

## 2023-05-01 DIAGNOSIS — H93.8X3 EAR PRESSURE, BILATERAL: ICD-10-CM

## 2023-05-01 DIAGNOSIS — R42 VERTIGO: ICD-10-CM

## 2023-05-01 RX ORDER — FLUTICASONE PROPIONATE 50 MCG
SPRAY, SUSPENSION (ML) NASAL
Qty: 19 G | Refills: 5 | Status: SHIPPED | OUTPATIENT
Start: 2023-05-01

## 2023-05-08 NOTE — PROGRESS NOTES
[address]   Cardiac Consultation    Referring Provider:  María Michael MD     Chief Complaint   Patient presents with    New Patient    Hyperlipidemia    Palpitations    Other     Feeling like she is moving when she is staying still       Abraham Pichardo   1965      History of Present Illness:   Abraham Pichardo is a 62 y.o. female who us here today as a new patient consult at the request of Nilda Chavez MD, for palpitations and SVT. She has a past medical history of hypothyroidism. She presented to the ER 4/30/2023 due to palpitations, EKG showed SVT, rate 159 bpm. She did not convert with vagal maneuvers. Ultimately, she did convert with adenosine. Today she states she is a non smoker. No family history that of heart diease that she is aware of. She does not take any dietary supplements. She drinks a few diet cokes a day. She uses sinus medication with a decongestant around once per week. She states she was in the middle of an exercise class and started to feel light headed along with palpitations. States her heart felt like it was being fast, she stopped exercise and her heart rate remained elevated. She tried to stop and rest with no relief. She went to the ER and was treated with adenosine. States she started to feel back to normal as her heart rate went down. The whole episode lasted for around 2 hours. Has never happened before or since. She states she has been attending an exercise class for the last 4 years and generally feels well with activity. She states she has not been sleeping well and had had episodes of feeling like she is moving in the bed that she attributes to a history of vertigo. She has also noticed these episodes while driving. No palpitations while having vertigo symptoms. Patient currently denies any weight gain, edema, chest pain, shortness of breath, and syncope.       Past Medical History:   has a past medical history of Bulimia, Family history of colon

## 2023-05-09 ENCOUNTER — TELEPHONE (OUTPATIENT)
Dept: CARDIOLOGY CLINIC | Age: 58
End: 2023-05-09

## 2023-05-09 ENCOUNTER — OFFICE VISIT (OUTPATIENT)
Dept: CARDIOLOGY CLINIC | Age: 58
End: 2023-05-09
Payer: COMMERCIAL

## 2023-05-09 VITALS
OXYGEN SATURATION: 98 % | DIASTOLIC BLOOD PRESSURE: 72 MMHG | HEART RATE: 80 BPM | SYSTOLIC BLOOD PRESSURE: 134 MMHG | HEIGHT: 65 IN | BODY MASS INDEX: 26.66 KG/M2 | WEIGHT: 160 LBS

## 2023-05-09 DIAGNOSIS — I47.1 SVT (SUPRAVENTRICULAR TACHYCARDIA) (HCC): Primary | ICD-10-CM

## 2023-05-09 DIAGNOSIS — R42 DIZZINESS: ICD-10-CM

## 2023-05-09 DIAGNOSIS — R42 LIGHT HEADED: ICD-10-CM

## 2023-05-09 DIAGNOSIS — R94.31 ABNORMAL EKG: ICD-10-CM

## 2023-05-09 PROBLEM — I47.10 SVT (SUPRAVENTRICULAR TACHYCARDIA): Status: ACTIVE | Noted: 2023-05-09

## 2023-05-09 PROCEDURE — 99244 OFF/OP CNSLTJ NEW/EST MOD 40: CPT | Performed by: INTERNAL MEDICINE

## 2023-05-09 PROCEDURE — 93270 REMOTE 30 DAY ECG REV/REPORT: CPT | Performed by: INTERNAL MEDICINE

## 2023-05-09 NOTE — PATIENT INSTRUCTIONS
Plan:  ~If you have another episode of SVT, you can try vagal maneuvers. If you are driving you need to pull over. Go to the ER if arrhythmia does not resolve   ~Recommend a CT calcium score which is a test used as a screening tool for coronary artery disease. If the score is above 0, then would recommend Aspirin and Statin therapy. This test is considered a screening tool so it is not covered by insurance. [employer] [phone] ([phone])  ~Carotid dopplers   ~Cardiac event monitor for 4 weeks- do your normal activity   ~Recommend an echocardiogram which is an ultrasound of your heart to evaluate heart function, structures and valves. ~Discussed medication therapy to treat SVT, example is diltiazem which is a calcium channel blocker   ~Discussed a catheter ablation to treat SVT  She would like to hold off on treatment and see if she has recurrent episodes   Check blood pressure and heart rate at home a few times per week- keep a log with dates and times and bring to office visit   Regular exercise and following a healthy diet encouraged   Follow up with me   ~Referral to EP team in 2 months     Your provider has ordered testing for further evaluation. An order/prescription has been included in your paper work. To schedule outpatient testing, contact Central Scheduling by calling [address] [place] [address] ([phone]).

## 2023-05-09 NOTE — TELEPHONE ENCOUNTER
Pt saw [address] today and was advised to see EP as a new pt for svt and to see EP in 2months. I scheduled pt to see VICTOR HUGOB on 7/12/23 at 330p. Is this ok? I informed the pt that our office will call her back if there is an issue with that date/time. I informed her if she DOES NOT hear back from office that the appt is ok to keep.

## 2023-05-09 NOTE — TELEPHONE ENCOUNTER
Monitor placed by [address] [place] [address] [employer]  Length of monitor 28 dyas  Monitor ordered by [facility] FOR [facility]  Serial number [id number]  Bluetooth ID [id number]  Activation successful prior to pt leaving office?  Yes

## 2023-05-10 ENCOUNTER — OFFICE VISIT (OUTPATIENT)
Dept: FAMILY MEDICINE CLINIC | Age: 58
End: 2023-05-10
Payer: COMMERCIAL

## 2023-05-10 VITALS
HEIGHT: 65 IN | OXYGEN SATURATION: 98 % | DIASTOLIC BLOOD PRESSURE: 86 MMHG | WEIGHT: 158 LBS | RESPIRATION RATE: 16 BRPM | SYSTOLIC BLOOD PRESSURE: 128 MMHG | BODY MASS INDEX: 26.33 KG/M2 | HEART RATE: 73 BPM

## 2023-05-10 DIAGNOSIS — I47.1 SVT (SUPRAVENTRICULAR TACHYCARDIA) (HCC): ICD-10-CM

## 2023-05-10 DIAGNOSIS — R42 VERTIGO: ICD-10-CM

## 2023-05-10 DIAGNOSIS — Z09 HOSPITAL DISCHARGE FOLLOW-UP: Primary | ICD-10-CM

## 2023-05-10 PROCEDURE — 1111F DSCHRG MED/CURRENT MED MERGE: CPT | Performed by: REGISTERED NURSE

## 2023-05-10 PROCEDURE — 99214 OFFICE O/P EST MOD 30 MIN: CPT | Performed by: REGISTERED NURSE

## 2023-05-10 RX ORDER — MECLIZINE HCL 12.5 MG/1
12.5 TABLET ORAL 2 TIMES DAILY PRN
Qty: 30 TABLET | Refills: 1 | Status: SHIPPED | OUTPATIENT
Start: 2023-05-10

## 2023-05-10 ASSESSMENT — PATIENT HEALTH QUESTIONNAIRE - PHQ9
1. LITTLE INTEREST OR PLEASURE IN DOING THINGS: 0
SUM OF ALL RESPONSES TO PHQ QUESTIONS 1-9: 0
SUM OF ALL RESPONSES TO PHQ9 QUESTIONS 1 & 2: 0
SUM OF ALL RESPONSES TO PHQ QUESTIONS 1-9: 0
2. FEELING DOWN, DEPRESSED OR HOPELESS: 0

## 2023-05-10 NOTE — PROGRESS NOTES
Post-Discharge Transitional Care  Follow Up      Mateo Abdi   YOB: 1965    Date of Office Visit:  5/10/2023  Date of Hospital Admission: 4/30/23  Date of Hospital Discharge: 4/30/23  Risk of hospital readmission (high >=14%. Medium >=10%) :No data recorded    Care management risk score Rising risk (score 2-5) and Complex Care (Scores >=6): No Risk Score On File     Non face to face  following discharge, date last encounter closed (first attempt may have been earlier): *No documented post hospital discharge outreach found in the last 14 days    Call initiated 2 business days of discharge: *No response recorded in the last 14 days    ASSESSMENT/PLAN:   Hospital discharge follow-up  -     MA DISCHARGE MEDS RECONCILED W/ CURRENT OUTPATIENT MED LIST  SVT (supraventricular tachycardia) (HCC)  Schedule echo, calcium scan and carotid ultrasound. Follow-up with cardiology as planned. Wear Holter monitor for 30 days as instructed by cardiologist.  Discussed signs and symptoms to monitor for and when to go to ED. Vertigo  Like symptoms of sensation of falling, especially when lying down, are due to vertigo. Instructed her to utilize her Flonase twice a day for 1 week. Meclizine prescribed-discussed risk and benefits of medication. She was given maneuvers to help resolve vertigo but has not done these yet-encouraged her to initiate maneuvers for treatment of vertigo. Educated on role of vestibular therapy-recommend referral for persistent or unresolved vertigo. Follow-up as needed or for worsening symptoms. -     meclizine (ANTIVERT) 12.5 MG tablet; Take 1 tablet by mouth 2 times daily as needed for Dizziness or Nausea, Disp-30 tablet, R-1Normal    Medical Decision Making: moderate complexity  Return if symptoms worsen or fail to improve. Subjective:   HPI:  Follow up of Hospital problems/diagnosis(es): She is here for follow up after being seen in the ED for SVT.  She followed up with

## 2023-05-10 NOTE — PATIENT INSTRUCTIONS
Follow up with cardiology as planned. Schedule ECHO, calcium scan and carotid artery US. Wear Holter monitor. Flonase twice a day for 1 week. Meclizine nightly for vertigo.

## 2023-05-19 DIAGNOSIS — I47.1 SVT (SUPRAVENTRICULAR TACHYCARDIA) (HCC): Primary | ICD-10-CM

## 2023-05-19 RX ORDER — DILTIAZEM HYDROCHLORIDE 120 MG/1
120 CAPSULE, COATED, EXTENDED RELEASE ORAL DAILY
Qty: 60 CAPSULE | Refills: 3 | Status: SHIPPED | OUTPATIENT
Start: 2023-05-19

## 2023-05-19 NOTE — TELEPHONE ENCOUNTER
[facility] requesting a call to the office.  Please confirm what pharmacy she would like med sent to?

## 2023-05-19 NOTE — TELEPHONE ENCOUNTER
Pt called into office to report that she had a recent SVT episode last night at home, it woke her up from sleep. She stated she would like to try diltiazem medication to help with SVT until she can be seen with EP (7/12/23 MEGAN), she stated this was discussed at last OV. Pended script for diltiazem 120 mg for signature if appropriate.      Pt did state that she has echo and carotid study scheduled for Denise

## 2023-06-16 PROCEDURE — 93272 ECG/REVIEW INTERPRET ONLY: CPT | Performed by: INTERNAL MEDICINE

## 2023-06-22 DIAGNOSIS — I47.1 SVT (SUPRAVENTRICULAR TACHYCARDIA) (HCC): ICD-10-CM

## 2023-06-22 DIAGNOSIS — R94.31 ABNORMAL EKG: ICD-10-CM

## 2023-06-23 ENCOUNTER — TELEPHONE (OUTPATIENT)
Dept: CARDIOLOGY CLINIC | Age: 58
End: 2023-06-23

## 2023-06-29 ENCOUNTER — TELEPHONE (OUTPATIENT)
Dept: CARDIOLOGY CLINIC | Age: 58
End: 2023-06-29

## 2023-06-29 ENCOUNTER — HOSPITAL ENCOUNTER (OUTPATIENT)
Dept: VASCULAR LAB | Age: 58
Discharge: HOME | End: 2023-06-29
Payer: COMMERCIAL

## 2023-06-29 ENCOUNTER — HOSPITAL ENCOUNTER (OUTPATIENT)
Dept: CARDIOLOGY | Age: 58
Discharge: HOME | End: 2023-06-29
Payer: COMMERCIAL

## 2023-06-29 DIAGNOSIS — R94.31 ABNORMAL EKG: ICD-10-CM

## 2023-06-29 DIAGNOSIS — R42 DIZZINESS: ICD-10-CM

## 2023-06-29 DIAGNOSIS — R42 LIGHT HEADED: ICD-10-CM

## 2023-06-29 DIAGNOSIS — I47.1 SVT (SUPRAVENTRICULAR TACHYCARDIA) (HCC): ICD-10-CM

## 2023-06-29 LAB
LV EF: 58 %
LVEF MODALITY: NORMAL

## 2023-06-29 PROCEDURE — 93306 TTE W/DOPPLER COMPLETE: CPT

## 2023-06-29 PROCEDURE — 93880 EXTRACRANIAL BILAT STUDY: CPT

## 2023-06-30 ENCOUNTER — TELEPHONE (OUTPATIENT)
Dept: CARDIOLOGY CLINIC | Age: 58
End: 2023-06-30

## 2023-06-30 RX ORDER — ASPIRIN 81 MG/1
81 TABLET, CHEWABLE ORAL DAILY
Qty: 30 TABLET | Refills: 3
Start: 2023-06-30

## 2023-06-30 RX ORDER — ROSUVASTATIN CALCIUM 40 MG/1
40 TABLET, COATED ORAL DAILY
Qty: 30 TABLET | Refills: 5 | Status: SHIPPED | OUTPATIENT
Start: 2023-06-30

## 2023-07-12 ENCOUNTER — OFFICE VISIT (OUTPATIENT)
Dept: CARDIOLOGY CLINIC | Age: 58
End: 2023-07-12

## 2023-07-12 VITALS
HEART RATE: 68 BPM | DIASTOLIC BLOOD PRESSURE: 86 MMHG | HEIGHT: 65 IN | WEIGHT: 162 LBS | OXYGEN SATURATION: 98 % | SYSTOLIC BLOOD PRESSURE: 132 MMHG | BODY MASS INDEX: 26.99 KG/M2

## 2023-07-12 DIAGNOSIS — I47.1 SVT (SUPRAVENTRICULAR TACHYCARDIA) (HCC): Primary | ICD-10-CM

## 2023-07-12 NOTE — PATIENT INSTRUCTIONS
Plan:  Stop Diltiazem to see if recurrence of SVT occurs   Discussed the risks and benefits of SVT ablation (literature provided)   Monitor blood pressure at home with goal below 130/80.    Maneuvers to attempt to stop an SVT episode  - Bear down and hold your breath as if you are having a bowel movement  - Place ice on your neck, forehead, or hand  - Massage the carotid artery on your neck for a few seconds (one side at a time)  Follow up with EPNP in 6 months

## 2023-07-25 ENCOUNTER — HOSPITAL ENCOUNTER (OUTPATIENT)
Dept: WOMENS IMAGING | Age: 58
Discharge: HOME | End: 2023-07-25
Payer: COMMERCIAL

## 2023-07-25 VITALS — WEIGHT: 155 LBS | HEIGHT: 65 IN | BODY MASS INDEX: 25.83 KG/M2

## 2023-07-25 DIAGNOSIS — Z12.31 VISIT FOR SCREENING MAMMOGRAM: ICD-10-CM

## 2023-07-25 PROCEDURE — 77067 SCR MAMMO BI INCL CAD: CPT

## 2023-07-25 RX ORDER — ROSUVASTATIN CALCIUM 40 MG/1
TABLET, COATED ORAL
Qty: 90 TABLET | Refills: 3 | Status: SHIPPED | OUTPATIENT
Start: 2023-07-25

## 2023-07-25 NOTE — TELEPHONE ENCOUNTER
7/12/2023 DUANE  5/9/2023 [facility]    8/21/2023 [facility] upcoming appt

## 2023-07-26 ENCOUNTER — TELEPHONE (OUTPATIENT)
Dept: WOMENS IMAGING | Age: 58
End: 2023-07-26

## 2023-07-26 NOTE — TELEPHONE ENCOUNTER
Reviewed screening mammogram results with patient. Patient verbalized understanding. Patient has been scheduled for additional imaging.        ASHWIN Simeon, CN-BM  [facility]  [phone]

## 2023-08-03 ENCOUNTER — HOSPITAL ENCOUNTER (OUTPATIENT)
Dept: WOMENS IMAGING | Age: 58
Discharge: HOME | End: 2023-08-03
Attending: RADIOLOGY
Payer: COMMERCIAL

## 2023-08-03 ENCOUNTER — HOSPITAL ENCOUNTER (OUTPATIENT)
Dept: WOMENS IMAGING | Age: 58
End: 2023-08-03
Payer: COMMERCIAL

## 2023-08-03 DIAGNOSIS — R92.8 ABNORMAL MAMMOGRAM OF LEFT BREAST: ICD-10-CM

## 2023-08-03 PROCEDURE — G0279 TOMOSYNTHESIS, MAMMO: HCPCS

## 2023-08-21 ENCOUNTER — OFFICE VISIT (OUTPATIENT)
Dept: CARDIOLOGY CLINIC | Age: 58
End: 2023-08-21
Payer: COMMERCIAL

## 2023-08-21 VITALS
HEART RATE: 72 BPM | BODY MASS INDEX: 26.82 KG/M2 | WEIGHT: 161 LBS | OXYGEN SATURATION: 99 % | DIASTOLIC BLOOD PRESSURE: 84 MMHG | HEIGHT: 65 IN | SYSTOLIC BLOOD PRESSURE: 136 MMHG

## 2023-08-21 DIAGNOSIS — E78.5 HYPERLIPIDEMIA, UNSPECIFIED HYPERLIPIDEMIA TYPE: ICD-10-CM

## 2023-08-21 DIAGNOSIS — R94.31 ABNORMAL EKG: ICD-10-CM

## 2023-08-21 DIAGNOSIS — I47.1 SVT (SUPRAVENTRICULAR TACHYCARDIA) (HCC): Primary | ICD-10-CM

## 2023-08-21 PROCEDURE — 99214 OFFICE O/P EST MOD 30 MIN: CPT | Performed by: INTERNAL MEDICINE

## 2023-08-21 RX ORDER — DILTIAZEM HYDROCHLORIDE 120 MG/1
120 CAPSULE, COATED, EXTENDED RELEASE ORAL DAILY
Qty: 90 CAPSULE | Refills: 3 | Status: SHIPPED | OUTPATIENT
Start: 2023-08-21

## 2023-08-21 NOTE — PATIENT INSTRUCTIONS
Plan:  ~Labs in a few months- A1C, CMP, and fasting lipids   ~Ok to take over the counter fish oil to help with re  ~Recommend restarting Diltiazem 120 mg daily for hypertension   ~If you have another episode of SVT, you can try vagal maneuvers. If you are driving you need to pull over. Go to the ER if arrhythmia does not resolve   ~No need for CT calcium score since we know you have plaque in your carotid arteries and are treating this with Aspirin and Statin therapy   Cardiac medications reviewed including indications and pertinent side effects. Medication list updated at this visit. Check blood pressure and heart rate at home a few times per week- keep a log with dates and times and bring to office visit- goal blood pressure is more like 120/70's   Regular exercise and following a healthy diet encouraged- low carb diet   ~No need to limit exercise.  If you start to have episodes of SVT- then would recommend an ablation   Follow up with me in 6 months   Repeat carotid dopplers next year   Repeat echocardiogram in 2 years

## 2023-08-21 NOTE — PROGRESS NOTES
[facility]   Cardiac Consultation    Referring Provider:  Mario Franklin MD     Chief Complaint   Patient presents with    Follow-up    Hyperlipidemia      Geronimo Klein   1965      History of Present Illness:   Geronimo Klein is a 62 y.o. female who us here today for follow up and to review testing. She was initially seen as a new patient consult at the request of Carl Monroe MD, for palpitations and SVT. She has a past medical history of hypothyroidism. She presented to the ER 4/30/2023 due to palpitations, EKG showed SVT, rate 159 bpm. She did not convert with vagal maneuvers. Ultimately, she did convert with adenosine. Initial visit- she states she is a non smoker. No family history that of heart diease that she is aware of. She does not take any dietary supplements. She drinks a few diet cokes a day. She uses sinus medication with a decongestant around once per week. She states she was in the middle of an exercise class and started to feel light headed along with palpitations. States her heart felt like it was being fast, she stopped exercise and her heart rate remained elevated. She tried to stop and rest with no relief. She went to the ER and was treated with adenosine. States she started to feel back to normal as her heart rate went down. The whole episode lasted for around 2 hours. Has never happened before or since. She states she has been attending an exercise class for the last 4 years and generally feels well with activity. She states she has not been sleeping well and had had episodes of feeling like she is moving in the bed that she attributes to a history of vertigo. She has also noticed these episodes while driving. No palpitations while having vertigo symptoms. Patient currently denies any weight gain, edema, chest pain, shortness of breath, and syncope.   Cardiac event monitor 5/9-6/6/2023- Average heart rate 75 (), PAC burden 0.04%, PVC burden <0.01%, and

## 2023-08-22 ENCOUNTER — TELEPHONE (OUTPATIENT)
Dept: FAMILY MEDICINE CLINIC | Age: 58
End: 2023-08-22

## 2023-08-22 DIAGNOSIS — D35.2 PITUITARY MICROADENOMA (HCC): ICD-10-CM

## 2023-08-22 DIAGNOSIS — E03.9 ACQUIRED HYPOTHYROIDISM: Primary | ICD-10-CM

## 2023-08-22 NOTE — TELEPHONE ENCOUNTER
----- Message from Wanda Mode sent at 8/22/2023  1:19 PM EDT -----  Subject: Referral Request    Reason for referral request? Pt is requesting a referral to the following   endocrinologist; please advise  Provider patient wants to be referred to(if known):     Provider Phone Number(if known): Additional Information for Provider?  FAX NUMBER FOR [facility]   [phone]  ---------------------------------------------------------------------------  --------------  Geronimo Anderson INFO    [phone]; OK to leave message on voicemail  ---------------------------------------------------------------------------  --------------

## 2023-08-22 NOTE — TELEPHONE ENCOUNTER
Pt contacted and requested the name for referral. She would like referral for [facility] and fax to [phone]. Pt informed we will complete when RS returns to the office.

## 2023-09-25 RX ORDER — HYDROXYZINE HYDROCHLORIDE 25 MG/1
TABLET, FILM COATED ORAL
Qty: 90 TABLET | Refills: 3 | Status: SHIPPED | OUTPATIENT
Start: 2023-09-25

## 2023-09-25 NOTE — TELEPHONE ENCOUNTER
Last Office Visit  -  5/10/23  Next Office Visit  -  11/6/23    Last Filled  -  10/3/22  HYDROXYZINE HCL 25 MG TABLET

## 2023-09-29 ENCOUNTER — TELEPHONE (OUTPATIENT)
Dept: FAMILY MEDICINE CLINIC | Age: 58
End: 2023-09-29

## 2023-09-29 DIAGNOSIS — D35.2 PITUITARY MICROADENOMA (HCC): Primary | ICD-10-CM

## 2023-09-29 DIAGNOSIS — E03.9 ACQUIRED HYPOTHYROIDISM: ICD-10-CM

## 2023-09-29 NOTE — TELEPHONE ENCOUNTER
Pt would like to be referred to a different endocrinologist as that doctor's office never returned her phone calls. The new endo doctor is Katlin Brar. Their office number is [phone] in case we needed that number.

## 2023-10-31 DIAGNOSIS — H93.8X3 EAR PRESSURE, BILATERAL: ICD-10-CM

## 2023-10-31 DIAGNOSIS — R42 VERTIGO: ICD-10-CM

## 2023-10-31 RX ORDER — FLUTICASONE PROPIONATE 50 MCG
SPRAY, SUSPENSION (ML) NASAL
Qty: 48 G | Refills: 1 | Status: SHIPPED | OUTPATIENT
Start: 2023-10-31

## 2023-11-05 NOTE — PROGRESS NOTES
Assessment/Plan:    Stanford Hammans was seen today for annual exam.    Diagnoses and all orders for this visit:    Annual physical exam   To have labs in 2/2024 per cards. Cont asa, statin, ccb    Lipoma of neck  -     External Referral To ENT, Dr Campos Mahoney    SVT (supraventricular tachycardia)   Has not had recurrence since initial ED visit 4/2023. Pt to cont annual cards f/u. Pain in axilla, unspecified laterality  -     aluminum chloride (DRYSOL) 20 % external solution; Apply nightly once weekly. Pt has used in past, helpful to mitigate axillary irritation from sweating while working out. There are no Patient Instructions on file for this visit. Patient: Nicky Breaux is a 62 y. o.female who presents today with the following Chief Complaint(s):  Chief Complaint   Patient presents with    Annual Exam         HPI: Pt with hx of hld bulemia, pit microadenoma s/p bromocriptine x 10 yr and hypothyroidism and is transferring from Dr Daniela Scott to Dr Martha Kirby. No nipple d/c or visual distortion. Pt is following [employer] and is not losing wt as she has in the past.     Has felt tired lately, Son, wife, 1 and 4 yr grandkids are staying with pt. Wonders if that is source. Takes mvi with iron. Hgb is nl when donates blood. Pt presented to ED 4/2023 with palpitiations, was found to be in SVT rate 159. Pt converted NST with adenosine. Has had no further episodes. Pt is on ASA 81 mg qd, crestor 40 mg and diltiazem 120 qd, no BRIAN. Donates blood q 12 wk on avg, is A-. Denies dizziness after donating. Does exercise classes tiw-qid, cleans 6 houses per wk, is very active. Does class 4 x wk at [employer], also does does wt training. Gyn has pt cont ocp to lower risk of female ca. Has menses q 3 mo with Seasonique. Pt states Dr Damaso Henley states that as long as she feels good on rx, there's no reason to d/c it. Has 8/2023 a1c, lipids, cmp per Dr Natalie Mason 8/2023.      Has used Drysol in past, helpful for

## 2023-11-06 ENCOUNTER — OFFICE VISIT (OUTPATIENT)
Dept: FAMILY MEDICINE CLINIC | Age: 58
End: 2023-11-06
Payer: COMMERCIAL

## 2023-11-06 VITALS
SYSTOLIC BLOOD PRESSURE: 130 MMHG | DIASTOLIC BLOOD PRESSURE: 78 MMHG | OXYGEN SATURATION: 99 % | BODY MASS INDEX: 27.32 KG/M2 | HEART RATE: 82 BPM | HEIGHT: 65 IN | WEIGHT: 164 LBS

## 2023-11-06 DIAGNOSIS — M79.629 PAIN IN AXILLA, UNSPECIFIED LATERALITY: ICD-10-CM

## 2023-11-06 DIAGNOSIS — I47.10 SVT (SUPRAVENTRICULAR TACHYCARDIA): ICD-10-CM

## 2023-11-06 DIAGNOSIS — D17.0 LIPOMA OF NECK: ICD-10-CM

## 2023-11-06 DIAGNOSIS — Z00.00 ANNUAL PHYSICAL EXAM: Primary | ICD-10-CM

## 2023-11-06 PROCEDURE — 99396 PREV VISIT EST AGE 40-64: CPT | Performed by: FAMILY MEDICINE

## 2023-11-13 ENCOUNTER — TELEPHONE (OUTPATIENT)
Dept: FAMILY MEDICINE CLINIC | Age: 58
End: 2023-11-13

## 2023-11-13 DIAGNOSIS — J02.9 SORE THROAT: Primary | ICD-10-CM

## 2023-11-13 RX ORDER — AMOXICILLIN 500 MG/1
500 CAPSULE ORAL 2 TIMES DAILY
Qty: 14 CAPSULE | Refills: 0 | Status: SHIPPED | OUTPATIENT
Start: 2023-11-13 | End: 2023-11-20

## 2023-11-13 NOTE — TELEPHONE ENCOUNTER
Pt called in to see if an antibiotic could be called in for her sore throat she had last week during her visit.  Please advise [phone]

## 2023-11-22 ENCOUNTER — OFFICE VISIT (OUTPATIENT)
Dept: ENDOCRINOLOGY | Age: 58
End: 2023-11-22
Payer: COMMERCIAL

## 2023-11-22 VITALS
WEIGHT: 164 LBS | HEART RATE: 80 BPM | HEIGHT: 65 IN | SYSTOLIC BLOOD PRESSURE: 136 MMHG | BODY MASS INDEX: 27.32 KG/M2 | DIASTOLIC BLOOD PRESSURE: 92 MMHG | RESPIRATION RATE: 16 BRPM

## 2023-11-22 DIAGNOSIS — D35.2 PITUITARY MICROADENOMA (HCC): ICD-10-CM

## 2023-11-22 DIAGNOSIS — E55.9 HYPOVITAMINOSIS D: ICD-10-CM

## 2023-11-22 DIAGNOSIS — E03.9 HYPOTHYROIDISM (ACQUIRED): Primary | ICD-10-CM

## 2023-11-22 DIAGNOSIS — E03.9 HYPOTHYROIDISM (ACQUIRED): ICD-10-CM

## 2023-11-22 PROCEDURE — 99204 OFFICE O/P NEW MOD 45 MIN: CPT | Performed by: INTERNAL MEDICINE

## 2023-11-22 NOTE — PROGRESS NOTES
[facility]  Initial Patient Visit        Patient:  Nicky Breaux                                               : 1965    MRN: [id number]  Date : 2023        CHIEF COMPLAINT:   Chief Complaint   Patient presents with    New Patient     Pituitary microadenoma      HISTORY OF PRESENT ILLNESS:   Nicky Breaux is a pleasant 62 y.o. female who presents to establish care for pituitary microadenoma and hypothyroidism. She was referred by Dr. Chinedu Downs. She has history of hyperlipidemia and bulimia. She has a BMI of 27. I reviewed outside records from Dr. Grace Andre.   Patient has history of hypothyroidism that was diagnosed around , currently on levothyroxine 88 mcg daily. She reports compliance with intake. She typically takes levothyroxine overnight when she wakes up to use the restroom. She denies missing pills. Most recent thyroid labs from 2023 showed a TSH of 2.0 with normal free T4. Patient has history of prolactinoma diagnosed around . At that time she had irregular menses with no galactorrhea, visual problems or headaches. She was previously treated with bromocriptine. She was subsequently able to stop bromocriptine in  without recurrence of prolactinoma. Most recent prolactin level was 17.9 from 2023. Patient at this time reports tiredness. She also reports weight gain of about 30 pounds in the past 5 years. She reports that she is a long-term member of weight watchers. She was previously able to lose weight while following weight watchers rules. More recently, she had not been able to and she finds that discouraging. She works out at [employer] about 4 to 5 days/week mostly aerobics and weightlifting. No recent steroid administration. She does not smoke and does not drink. No illicit drug use. She lives with her . Family history significant for hypothyroidism in both mother and brother.       Past Medical History:

## 2023-11-23 LAB
25(OH)D3 SERPL-MCNC: 44.6 NG/ML
T4 FREE SERPL-MCNC: 1.3 NG/DL (ref 0.9–1.8)
TSH SERPL DL<=0.005 MIU/L-ACNC: 1.95 UIU/ML (ref 0.27–4.2)

## 2023-11-24 DIAGNOSIS — E03.9 HYPOTHYROIDISM (ACQUIRED): Primary | ICD-10-CM

## 2023-11-24 RX ORDER — LEVOTHYROXINE SODIUM 0.1 MG/1
100 TABLET ORAL DAILY
Qty: 90 TABLET | Refills: 1 | Status: SHIPPED | OUTPATIENT
Start: 2023-11-24

## 2023-12-05 ENCOUNTER — TELEPHONE (OUTPATIENT)
Dept: FAMILY MEDICINE CLINIC | Age: 58
End: 2023-12-05

## 2023-12-05 RX ORDER — FLUCONAZOLE 150 MG/1
150 TABLET ORAL
Qty: 2 TABLET | Refills: 0 | Status: SHIPPED | OUTPATIENT
Start: 2023-12-05 | End: 2023-12-11

## 2023-12-05 NOTE — TELEPHONE ENCOUNTER
Pt has a yeast infection from the abx and would like to know if some one can rx something in for her.

## 2024-01-30 ENCOUNTER — TELEPHONE (OUTPATIENT)
Dept: CARDIOLOGY CLINIC | Age: 59
End: 2024-01-30

## 2024-01-30 NOTE — TELEPHONE ENCOUNTER
----- Message from Maxim Mancia MD sent at 1/30/2024  9:21 AM EST -----  Please notify patient that their lab results are ok  Chol much better

## 2024-01-30 NOTE — TELEPHONE ENCOUNTER
Created telephone encounter. Per Pt HIPAA from can leave results on machine. LMOM relaying message per physician. Pt to call the office with any concerns.   Labs forwarded to PCP as requested.

## 2024-01-30 NOTE — TELEPHONE ENCOUNTER
----- Message from Maxim Mancia MD sent at 1/30/2024 11:54 AM EST -----  Please notify patient that of results and forward to PCP     Please notify patient that their lab results are ok  Chol much better

## 2024-02-01 NOTE — PROGRESS NOTES
brief PSVT (20 beats at 153 bpm)    Hashimoto's disease  Hypothyroidism: Synthroid (TSH 2.8-1/29/2024)  Hyperlipidemia  Mild aortic regurgitation    Plan:   1. Continue aspirin 81 mg daily  2.  Increase diltiazem 180 mg daily  3.  Continue Crestor 40 mg daily  4. Continue with regular physical exercise  5. Keep scheduled appointment with Dr Mancia later this month      Follow up in 6 months with ANASTASIA    MDM 30 minutes         Elisabet Chavarria APRN-CNP  [facility] [place]  [phone]

## 2024-02-02 ENCOUNTER — OFFICE VISIT (OUTPATIENT)
Dept: CARDIOLOGY CLINIC | Age: 59
End: 2024-02-02
Payer: COMMERCIAL

## 2024-02-02 ENCOUNTER — PATIENT MESSAGE (OUTPATIENT)
Dept: FAMILY MEDICINE CLINIC | Age: 59
End: 2024-02-02

## 2024-02-02 VITALS
DIASTOLIC BLOOD PRESSURE: 88 MMHG | OXYGEN SATURATION: 98 % | HEART RATE: 78 BPM | SYSTOLIC BLOOD PRESSURE: 132 MMHG | HEIGHT: 65 IN | BODY MASS INDEX: 27.49 KG/M2 | WEIGHT: 165 LBS

## 2024-02-02 DIAGNOSIS — I47.10 PSVT (PAROXYSMAL SUPRAVENTRICULAR TACHYCARDIA): Primary | ICD-10-CM

## 2024-02-02 PROCEDURE — 99214 OFFICE O/P EST MOD 30 MIN: CPT

## 2024-02-02 RX ORDER — DILTIAZEM HYDROCHLORIDE 180 MG/1
180 CAPSULE, COATED, EXTENDED RELEASE ORAL DAILY
Qty: 30 CAPSULE | Refills: 3 | Status: SHIPPED | OUTPATIENT
Start: 2024-02-02

## 2024-02-02 NOTE — TELEPHONE ENCOUNTER
From: Deloris Tejada  To: Dr. Paula Kaplan  Sent: 2/2/2024 3:43 PM EST  Subject: Weight Loss (or lack of!)    Dhara Kaplan,  I had my appointment with the electrophysicist cardiologist regarding my heart and SVT episode. She said everything looks great with my EKG and lipid numbers. We discussed my weight (165) and the fact that I have been trying to lose weight with no success. I have tried Weight Watchers, which in the past have always had success. I have tried Noom (more of a calorie counting system) and followed it religiously with no weight loss. Not even 1 pound. She thinks it would be healthier for my cardiovascular system and heart if I could lose some weight. I currently exercise 4-5 times a week and have been faithful with exercise for 5 years now. She suggested that a weight loss drug (possibly Ozempic or a similar one) might be appropriate for me to try. She said to talk to you to get your opinion/recommendation on this. Please let me know what you think. I would like to try this route if you are on board. If you would like to see me in the office, I can schedule an appointment or   if you don’t need to see me, we can try the weight loss drug. Please advise. Thanks so much.    Deloris Tejada [phone]

## 2024-02-02 NOTE — PATIENT INSTRUCTIONS
1. Continue aspirin 81 mg daily  2.  Increase diltiazem 180 mg daily  3.  Continue Crestor 40 mg daily  4. Continue with regular physical exercise  5. Keep scheduled appointment with Dr Mancia later this month

## 2024-02-19 ENCOUNTER — OFFICE VISIT (OUTPATIENT)
Dept: CARDIOLOGY CLINIC | Age: 59
End: 2024-02-19
Payer: COMMERCIAL

## 2024-02-19 VITALS
OXYGEN SATURATION: 98 % | WEIGHT: 167 LBS | DIASTOLIC BLOOD PRESSURE: 84 MMHG | HEIGHT: 65 IN | BODY MASS INDEX: 27.82 KG/M2 | SYSTOLIC BLOOD PRESSURE: 134 MMHG | HEART RATE: 72 BPM

## 2024-02-19 DIAGNOSIS — I47.10 SVT (SUPRAVENTRICULAR TACHYCARDIA): Primary | ICD-10-CM

## 2024-02-19 DIAGNOSIS — R94.31 ABNORMAL EKG: ICD-10-CM

## 2024-02-19 PROCEDURE — 99214 OFFICE O/P EST MOD 30 MIN: CPT | Performed by: INTERNAL MEDICINE

## 2024-02-19 NOTE — PATIENT INSTRUCTIONS
Plan:  Cardiac medications reviewed including indications and pertinent side effects. Medication list updated at this visit.   Check blood pressure and heart rate at home daily while at rest or the next 4 weeks- keep a log with dates and times and bring to office visit    ~Goal is 120-130/70-80's, rarely over 140/90   ~May consider adding another blood pressure agent if needed   ~Call or send us your blood pressure readings in one month. You can upload readings through My Chart   Regular exercise and following a healthy diet encouraged   Follow up with me in 1 year

## 2024-02-19 NOTE — PROGRESS NOTES
[facility]   Cardiac Consultation    Referring Provider:  Paula Kaplan MD     Chief Complaint   Patient presents with    Follow-up    Other     SVT    Hyperlipidemia      Deloris Tejada   1965      History of Present Illness:   Deloris Tejada is a 58 y.o. female who us here today for follow up for SVT and palpitations. She has a past medical history of hypothyroidism. She presented to the ER 4/30/2023 due to palpitations, EKG showed SVT, rate 159 bpm. She did not convert with vagal maneuvers.  Ultimately, she did convert with adenosine.   Initial visit- she states she is a non smoker. No family history that of heart diease that she is aware of. She does not take any dietary supplements. She drinks a few diet cokes a day. She uses sinus medication with a decongestant around once per week. She states she was in the middle of an exercise class and started to feel light headed along with palpitations. States her heart felt like it was being fast, she stopped exercise and her heart rate remained elevated. She tried to stop and rest with no relief. She went to the ER and was treated with adenosine. States she started to feel back to normal as her heart rate went down. The whole episode lasted for around 2 hours. Has never happened before or since. She states she has been attending an exercise class for the last 4 years and generally feels well with activity. She states she has not been sleeping well and had had episodes of feeling like she is moving in the bed that she attributes to a history of vertigo. She has also noticed these episodes while driving. No palpitations while having vertigo symptoms. Patient currently denies any weight gain, edema, chest pain, shortness of breath, and syncope.  Cardiac event monitor 5/9-6/6/2023- Average heart rate 75 (), PAC burden 0.04%, PVC burden <0.01%, and brief PSVT. Echocardiogram 6/29/2023 showed an EF of 55-60%, mild aortic regurgitation. Patient was

## 2024-03-04 RX ORDER — DILTIAZEM HYDROCHLORIDE 180 MG/1
CAPSULE, COATED, EXTENDED RELEASE ORAL DAILY
Qty: 90 CAPSULE | Refills: 3 | Status: SHIPPED | OUTPATIENT
Start: 2024-03-04

## 2024-03-18 ENCOUNTER — PATIENT MESSAGE (OUTPATIENT)
Dept: CARDIOLOGY CLINIC | Age: 59
End: 2024-03-18

## 2024-03-18 NOTE — TELEPHONE ENCOUNTER
From: Deloris Tejada  To: Dr. Maxim Mancia  Sent: 3/18/2024 8:36 AM EDT  Subject: Blood Pressure    Hello Dr Mancia,    I visited you on 2/19/24 and you requested that I take my blood pressure daily for about a month. I am sending a picture of my daily log. Please let me know if you need any additional information. Thank you.     Deloris Tejada

## 2024-04-29 DIAGNOSIS — H93.8X3 EAR PRESSURE, BILATERAL: ICD-10-CM

## 2024-04-29 DIAGNOSIS — R42 VERTIGO: ICD-10-CM

## 2024-04-29 RX ORDER — FLUTICASONE PROPIONATE 50 MCG
2 SPRAY, SUSPENSION (ML) NASAL DAILY
Qty: 48 G | Refills: 3 | Status: SHIPPED | OUTPATIENT
Start: 2024-04-29

## 2024-04-29 NOTE — TELEPHONE ENCOUNTER
[facility] Patient is requesting a rx for     fluticasone (FLONASE) 50 MCG/ACT nasal spray     Last seen 11/6/2023    Next visit Visit date not found

## 2024-04-29 NOTE — TELEPHONE ENCOUNTER
Last Office Visit  -  11/6/23  Next Office Visit  -  n/a    Last Filled  -  10/31/23  Last UDS -    Contract -

## 2024-05-09 ENCOUNTER — TELEPHONE (OUTPATIENT)
Dept: CARDIOLOGY CLINIC | Age: 59
End: 2024-05-09

## 2024-05-09 RX ORDER — ROSUVASTATIN CALCIUM 40 MG/1
40 TABLET, COATED ORAL DAILY
Qty: 90 TABLET | Refills: 3 | Status: SHIPPED | OUTPATIENT
Start: 2024-05-09

## 2024-05-09 NOTE — TELEPHONE ENCOUNTER
Refill  rosuvastatin (CRESTOR) 40 MG tablet   [facility] [address] [facility] - [place], [place] - [address] [place] [address] [place] - P [phone] - F [phone]       Lov 2/19/24  Lab  cmp, Lipid 1/29/24  Not due till 2/2025

## 2024-05-10 RX ORDER — ROSUVASTATIN CALCIUM 40 MG/1
TABLET, COATED ORAL
Qty: 90 TABLET | Refills: 3 | OUTPATIENT
Start: 2024-05-10

## 2024-05-10 NOTE — TELEPHONE ENCOUNTER
Duplicate refillefd 5/9/2024 2/19/2024 [facility]  8/12/2024 [facility] upcoming appt

## 2024-05-13 DIAGNOSIS — E03.9 HYPOTHYROIDISM (ACQUIRED): ICD-10-CM

## 2024-05-13 RX ORDER — LEVOTHYROXINE SODIUM 0.1 MG/1
100 TABLET ORAL DAILY
Qty: 90 TABLET | Refills: 1 | Status: SHIPPED | OUTPATIENT
Start: 2024-05-13

## 2024-05-13 NOTE — TELEPHONE ENCOUNTER
Requested Prescriptions     Pending Prescriptions Disp Refills    levothyroxine (SYNTHROID) 100 MCG tablet [Pharmacy Med Name: LEVOTHYROXINE 100 MCG TABLET] 90 tablet 1     Sig: TAKE 1 TABLET BY MOUTH EVERY DAY       [facility] [address] [facility] - [place], [place] - [address] [facility] - P [phone] - F [phone]  [address] [facility] [address]  Phone: [phone] Fax: [phone]    I reviewed your recent labs which are showing normal vitamin D.  Thyroid function tests are also in desired range.  Though, given your symptoms we may trial levothyroxine 100 mcg daily instead of you

## 2024-06-27 ENCOUNTER — PATIENT MESSAGE (OUTPATIENT)
Dept: FAMILY MEDICINE CLINIC | Age: 59
End: 2024-06-27

## 2024-06-27 RX ORDER — SCOLOPAMINE TRANSDERMAL SYSTEM 1 MG/1
1 PATCH, EXTENDED RELEASE TRANSDERMAL
Qty: 5 PATCH | Refills: 0 | Status: SHIPPED | OUTPATIENT
Start: 2024-06-27

## 2024-06-27 NOTE — TELEPHONE ENCOUNTER
From: Deloris Tejada  To: Dr. Paula Kaplan  Sent: 6/27/2024 1:28 PM EDT  Subject: Motion sickness patch    Hello Dr Kaplan. I will be traveling to [place] on 7/5/24 and would like to try the transdermal patch for motion sickness. I have meclizine prescribed but I would like something that lasts a little longer. [place] and I have a lot of issues with cars sickness. Would it be possible to prescribe a transdermal patch to control my motion sickness?

## 2024-08-05 RX ORDER — HYDROXYZINE HYDROCHLORIDE 25 MG/1
TABLET, FILM COATED ORAL
Qty: 90 TABLET | Refills: 3 | Status: SHIPPED | OUTPATIENT
Start: 2024-08-05

## 2024-08-09 NOTE — PROGRESS NOTES
[facility]   Electrophysiology Outpatient Note              Date:  August 9, 2024  Patient name: Deloris Tejada  YOB: 1965    Primary Care physician: Paula Kaplan MD    HISTORY OF PRESENT ILLNESS: The patient is a 58 y.o.  female with a history of hyperlipidemia, hypothyroidism, and PSVT    Patient originally seen 7/14/2023 after she presented to the emergency room on 4/30/2023 with complaints of palpitations.  Her ECG did demonstrate SVT rate 159.  She did not convert with vagal maneuvers and ultimately converted with adenosine. Patient wore a cardiac event monitor from 05/09/2023 to 06/06/2023 which demonstrated predominately SR with an average HR of 75 (). PAC burden 0.04%, PVC burden <0.01%, and brief PSVT.  At the time of her visit treatment options were discussed including AVNRT ablation.  Her diltiazem was discontinued to see if there is any recurrence of SVT.    She was seen by Dr Mancia 8/21/2023 and her blood pressure was borderline high.  She was restarted on her diltiazem at that time..  She complained of some episodes of dizziness however resolved after her PCP started Flonase    2/2/2024 she was seen for history of SVT. ECG shows SR with a HR of 71. Patient denies chest pain, shortness of breath and palpitations.  Patient states she exercises 4-5 times per week.  States she is having issues losing weight.  She states she has not been able to lose even \"1 pound\".  She is made healthy diet choices.  Discussed speaking with her PCP regarding weight loss options.  Her blood pressure today is on the normal side of high.  Will increase diltiazem dose    Today patient is being seen for history of SVT.  ECG reveals SR rate 75.  Patient states she has been feeling well.  She has no recurrent episodes of SVT.  Her blood pressure is been running within normal limits today .  She exercises 5 days a week.  Her mother is in nursing home and helps to take care

## 2024-08-12 ENCOUNTER — OFFICE VISIT (OUTPATIENT)
Dept: CARDIOLOGY CLINIC | Age: 59
End: 2024-08-12
Payer: COMMERCIAL

## 2024-08-12 ENCOUNTER — HOSPITAL ENCOUNTER (OUTPATIENT)
Dept: WOMENS IMAGING | Age: 59
Discharge: HOME | End: 2024-08-12
Payer: COMMERCIAL

## 2024-08-12 VITALS
WEIGHT: 164.8 LBS | DIASTOLIC BLOOD PRESSURE: 80 MMHG | BODY MASS INDEX: 27.46 KG/M2 | HEART RATE: 75 BPM | SYSTOLIC BLOOD PRESSURE: 136 MMHG | HEIGHT: 65 IN | OXYGEN SATURATION: 100 %

## 2024-08-12 VITALS — BODY MASS INDEX: 26.66 KG/M2 | HEIGHT: 65 IN | WEIGHT: 160 LBS

## 2024-08-12 DIAGNOSIS — I47.10 SVT (SUPRAVENTRICULAR TACHYCARDIA) (HCC): Primary | ICD-10-CM

## 2024-08-12 DIAGNOSIS — Z12.31 VISIT FOR SCREENING MAMMOGRAM: ICD-10-CM

## 2024-08-12 PROCEDURE — 93000 ELECTROCARDIOGRAM COMPLETE: CPT

## 2024-08-12 PROCEDURE — 77063 BREAST TOMOSYNTHESIS BI: CPT

## 2024-08-12 PROCEDURE — 99214 OFFICE O/P EST MOD 30 MIN: CPT

## 2024-08-12 NOTE — PATIENT INSTRUCTIONS
Continue aspirin 81 mg daily  Continue diltiazem 180 mg daily  Continue Crestor 40 mg daily  Continue with regular physical exercise    Follow up in 12 months Elisabet ROSAS

## 2024-11-08 DIAGNOSIS — E03.9 HYPOTHYROIDISM (ACQUIRED): ICD-10-CM

## 2024-11-08 RX ORDER — LEVOTHYROXINE SODIUM 100 UG/1
100 TABLET ORAL DAILY
Qty: 90 TABLET | Refills: 1 | Status: SHIPPED | OUTPATIENT
Start: 2024-11-08

## 2024-11-18 ASSESSMENT — PATIENT HEALTH QUESTIONNAIRE - PHQ9
2. FEELING DOWN, DEPRESSED OR HOPELESS: NOT AT ALL
SUM OF ALL RESPONSES TO PHQ9 QUESTIONS 1 & 2: 0
1. LITTLE INTEREST OR PLEASURE IN DOING THINGS: NOT AT ALL
SUM OF ALL RESPONSES TO PHQ QUESTIONS 1-9: 0
SUM OF ALL RESPONSES TO PHQ9 QUESTIONS 1 & 2: 0
2. FEELING DOWN, DEPRESSED OR HOPELESS: NOT AT ALL
SUM OF ALL RESPONSES TO PHQ QUESTIONS 1-9: 0
1. LITTLE INTEREST OR PLEASURE IN DOING THINGS: NOT AT ALL

## 2024-11-21 ENCOUNTER — OFFICE VISIT (OUTPATIENT)
Dept: FAMILY MEDICINE CLINIC | Age: 59
End: 2024-11-21

## 2024-11-21 VITALS
WEIGHT: 145 LBS | SYSTOLIC BLOOD PRESSURE: 120 MMHG | DIASTOLIC BLOOD PRESSURE: 80 MMHG | HEIGHT: 65 IN | HEART RATE: 73 BPM | OXYGEN SATURATION: 98 % | BODY MASS INDEX: 24.16 KG/M2

## 2024-11-21 DIAGNOSIS — Z82.0 FAMILY HISTORY OF ALZHEIMER DISEASE: ICD-10-CM

## 2024-11-21 DIAGNOSIS — E03.9 HYPOTHYROIDISM (ACQUIRED): ICD-10-CM

## 2024-11-21 DIAGNOSIS — Z23 NEEDS FLU SHOT: ICD-10-CM

## 2024-11-21 DIAGNOSIS — D35.2 PITUITARY MICROADENOMA (HCC): ICD-10-CM

## 2024-11-21 DIAGNOSIS — Z00.00 ANNUAL PHYSICAL EXAM: ICD-10-CM

## 2024-11-21 DIAGNOSIS — Z00.00 ANNUAL PHYSICAL EXAM: Primary | ICD-10-CM

## 2024-11-21 LAB
ALBUMIN SERPL-MCNC: 4.7 G/DL (ref 3.4–5)
ALBUMIN/GLOB SERPL: 1.9 {RATIO} (ref 1.1–2.2)
ALP SERPL-CCNC: 96 U/L (ref 40–129)
ALT SERPL-CCNC: 49 U/L (ref 10–40)
ANION GAP SERPL CALCULATED.3IONS-SCNC: 14 MMOL/L (ref 3–16)
AST SERPL-CCNC: 38 U/L (ref 15–37)
BASOPHILS # BLD: 0.1 K/UL (ref 0–0.2)
BASOPHILS NFR BLD: 0.8 %
BILIRUB SERPL-MCNC: 0.8 MG/DL (ref 0–1)
BUN SERPL-MCNC: 9 MG/DL (ref 7–20)
CALCIUM SERPL-MCNC: 9.9 MG/DL (ref 8.3–10.6)
CHLORIDE SERPL-SCNC: 103 MMOL/L (ref 99–110)
CHOLEST SERPL-MCNC: 144 MG/DL (ref 0–199)
CO2 SERPL-SCNC: 27 MMOL/L (ref 21–32)
CREAT SERPL-MCNC: 0.7 MG/DL (ref 0.6–1.1)
DEPRECATED RDW RBC AUTO: 13.6 % (ref 12.4–15.4)
EOSINOPHIL # BLD: 0.1 K/UL (ref 0–0.6)
EOSINOPHIL NFR BLD: 1.3 %
EST. AVERAGE GLUCOSE BLD GHB EST-MCNC: 114 MG/DL
GFR SERPLBLD CREATININE-BSD FMLA CKD-EPI: >90 ML/MIN/{1.73_M2}
GLUCOSE SERPL-MCNC: 91 MG/DL (ref 70–99)
HBA1C MFR BLD: 5.6 %
HCT VFR BLD AUTO: 38.7 % (ref 36–48)
HDLC SERPL-MCNC: 52 MG/DL (ref 40–60)
HGB BLD-MCNC: 12.7 G/DL (ref 12–16)
LDLC SERPL CALC-MCNC: 73 MG/DL
LYMPHOCYTES # BLD: 2.2 K/UL (ref 1–5.1)
LYMPHOCYTES NFR BLD: 31.9 %
MCH RBC QN AUTO: 29.5 PG (ref 26–34)
MCHC RBC AUTO-ENTMCNC: 32.9 G/DL (ref 31–36)
MCV RBC AUTO: 89.6 FL (ref 80–100)
MONOCYTES # BLD: 0.7 K/UL (ref 0–1.3)
MONOCYTES NFR BLD: 10.7 %
NEUTROPHILS # BLD: 3.8 K/UL (ref 1.7–7.7)
NEUTROPHILS NFR BLD: 55.3 %
PLATELET # BLD AUTO: 320 K/UL (ref 135–450)
PMV BLD AUTO: 9.5 FL (ref 5–10.5)
POTASSIUM SERPL-SCNC: 4.6 MMOL/L (ref 3.5–5.1)
PROLACTIN SERPL IA-MCNC: 17.9 NG/ML
PROT SERPL-MCNC: 7.2 G/DL (ref 6.4–8.2)
RBC # BLD AUTO: 4.32 M/UL (ref 4–5.2)
SODIUM SERPL-SCNC: 144 MMOL/L (ref 136–145)
T4 FREE SERPL-MCNC: 1.8 NG/DL (ref 0.9–1.8)
TRIGL SERPL-MCNC: 94 MG/DL (ref 0–150)
TSH SERPL DL<=0.005 MIU/L-ACNC: 0.04 UIU/ML (ref 0.27–4.2)
VLDLC SERPL CALC-MCNC: 19 MG/DL
WBC # BLD AUTO: 6.8 K/UL (ref 4–11)

## 2024-11-21 SDOH — ECONOMIC STABILITY: FOOD INSECURITY: WITHIN THE PAST 12 MONTHS, YOU WORRIED THAT YOUR FOOD WOULD RUN OUT BEFORE YOU GOT MONEY TO BUY MORE.: NEVER TRUE

## 2024-11-21 SDOH — ECONOMIC STABILITY: FOOD INSECURITY: WITHIN THE PAST 12 MONTHS, THE FOOD YOU BOUGHT JUST DIDN'T LAST AND YOU DIDN'T HAVE MONEY TO GET MORE.: NEVER TRUE

## 2024-11-21 SDOH — ECONOMIC STABILITY: INCOME INSECURITY: HOW HARD IS IT FOR YOU TO PAY FOR THE VERY BASICS LIKE FOOD, HOUSING, MEDICAL CARE, AND HEATING?: NOT HARD AT ALL

## 2024-11-21 NOTE — PROGRESS NOTES
Assessment/Plan:    Deloris was seen today for annual exam.    Diagnoses and all orders for this visit:    Annual physical exam  -     Hemoglobin A1C; Future  -     Lipid Panel; Future  -     Comprehensive Metabolic Panel; Future  -     CBC with Auto Differential; Future   Wt loss via stress reduction, low carb diet, routine exercise applauded.    Needs flu shot  -     Influenza, FLUCELVAX Trivalent, (age 6 mo+) IM, Preservative Free, 0.5mL    Family history of Alzheimer disease  -     Laine Elizalde, Sleep Medicine, [facility]   Pt has read of asso between OSMAR and AD. Pt denies typical OSMAR symptoms but would like eval, preferably at home if possible.      There are no Patient Instructions on file for this visit.       Patient: Deloris Tejada is a 59 y.o.female who presents today with the following Chief Complaint(s):  Chief Complaint   Patient presents with    Annual Exam         HPI: Pt with SVT x 1 (2023), hx of hld bulemia, pit microadenoma s/p bromocriptine x 10 yr and hypothyroidism and is transferring from Dr Guardado to Dr Erickson. No nipple d/c or visual distortion.     Has lost 21 lb in past 4 mo. Pt had elevated cortisol of 31 as checked by neighbor who is naturalopath. Has used Adrenaven to decrease cortisol. Pt had high stress with M with AD, passed in Oct. Has cut down carbs.     No recent palp. SVT was isolated event x 1.    Donates blood q 12 wk on avg, is A-. Denies dizziness after donating. Does exercise classes tiw-qid, cleans 6 houses per wk, is very active.     Dr Pisano has d/c'd ocp 9/1/24, does not feel different. No menses since. No hot flashes. Pap 1 yr ago nl.     F had esoph ca. EGD 8/22/23, neg, no Paredes's. No further EGD needed unless occ gerd increases.    Pt wonders about sleep study as she has read of asso of OSMAR and AD.           Current Outpatient Medications   Medication Sig Dispense Refill    levothyroxine (SYNTHROID) 100 MCG tablet TAKE 1 TABLET BY MOUTH EVERY DAY

## 2024-11-22 DIAGNOSIS — R79.89 ELEVATED LFTS: Primary | ICD-10-CM

## 2024-12-16 ENCOUNTER — OFFICE VISIT (OUTPATIENT)
Dept: ENDOCRINOLOGY | Age: 59
End: 2024-12-16
Payer: COMMERCIAL

## 2024-12-16 VITALS
DIASTOLIC BLOOD PRESSURE: 83 MMHG | SYSTOLIC BLOOD PRESSURE: 143 MMHG | HEART RATE: 66 BPM | WEIGHT: 143 LBS | BODY MASS INDEX: 23.82 KG/M2 | HEIGHT: 65 IN

## 2024-12-16 DIAGNOSIS — E03.9 HYPOTHYROIDISM (ACQUIRED): Primary | ICD-10-CM

## 2024-12-16 DIAGNOSIS — D35.2 PITUITARY MICROADENOMA (HCC): ICD-10-CM

## 2024-12-16 DIAGNOSIS — R79.89 ELEVATED LFTS: ICD-10-CM

## 2024-12-16 LAB
ALBUMIN SERPL-MCNC: 4.6 G/DL (ref 3.4–5)
ALP SERPL-CCNC: 99 U/L (ref 40–129)
ALT SERPL-CCNC: 37 U/L (ref 10–40)
AST SERPL-CCNC: 29 U/L (ref 15–37)
BILIRUB DIRECT SERPL-MCNC: 0.4 MG/DL (ref 0–0.3)
BILIRUB INDIRECT SERPL-MCNC: 0.6 MG/DL (ref 0–1)
BILIRUB SERPL-MCNC: 1 MG/DL (ref 0–1)
PROT SERPL-MCNC: 7.2 G/DL (ref 6.4–8.2)

## 2024-12-16 PROCEDURE — 99214 OFFICE O/P EST MOD 30 MIN: CPT | Performed by: INTERNAL MEDICINE

## 2024-12-16 RX ORDER — LEVOTHYROXINE SODIUM 88 UG/1
88 TABLET ORAL DAILY
Qty: 90 TABLET | Refills: 1 | Status: SHIPPED | OUTPATIENT
Start: 2024-12-16

## 2024-12-16 NOTE — PROGRESS NOTES
[facility]          Patient:  Deloris Tejada                                               : 1965    MRN: [id number]  Date : 2024        CHIEF COMPLAINT:   Chief Complaint   Patient presents with    Hypothyroidism      HISTORY OF PRESENT ILLNESS:   Deloris Tejada is a pleasant 59 y.o. female who presents for follow-up for pituitary microadenoma and hypothyroidism. She was referred by Dr. Kaplan. She has history of hyperlipidemia and bulimia. She has a BMI of 23.    I reviewed outside records from Dr. Mitch Dinh.   Patient has history of hypothyroidism that was diagnosed around , currently on levothyroxine 100 mcg daily.  She reports compliance with intake.  She typically takes levothyroxine overnight when she wakes up to use the restroom.  She denies missing pills.  Most recent thyroid labs from 2024 showed a TSH of 0.04, free T4 of 1.8.    Patient has history of prolactinoma diagnosed around .  At that time she had irregular menses with no galactorrhea, visual problems or headaches. She was previously treated with bromocriptine.  She was subsequently able to stop bromocriptine in  without recurrence of prolactinoma.  Most recent prolactin level was 17.9 from 2024.    Recently, she had been able to lose about 30 pounds with low-carb diet and intermittent fasting.  She continues to exercise regularly.  She had been diagnosed with SVT.  Following with cardiology.  No recent symptoms.  No tremors.  She reports interrupted sleep.  She had been under stress after she lost her mother in 2024.  Mother was dealing with Alzheimer's dementia.  Her daughter-in-law is expecting and has been dealing with medical issues.  She is also anxious about her son's family  No recent steroid administration.    She does not smoke and does not drink.  No illicit drug use.  She lives with her .  Family history significant for hypothyroidism in both mother and

## 2025-02-14 DIAGNOSIS — E03.9 HYPOTHYROIDISM (ACQUIRED): ICD-10-CM

## 2025-02-14 LAB
T4 FREE SERPL-MCNC: 1.3 NG/DL (ref 0.9–1.8)
TSH SERPL DL<=0.005 MIU/L-ACNC: 4.74 UIU/ML (ref 0.27–4.2)

## 2025-02-18 DIAGNOSIS — E03.9 HYPOTHYROIDISM (ACQUIRED): ICD-10-CM

## 2025-02-18 RX ORDER — LEVOTHYROXINE SODIUM 88 UG/1
TABLET ORAL
Qty: 92 TABLET | Refills: 1 | Status: SHIPPED | OUTPATIENT
Start: 2025-02-18

## 2025-04-10 ENCOUNTER — TELEPHONE (OUTPATIENT)
Dept: FAMILY MEDICINE CLINIC | Age: 60
End: 2025-04-10

## 2025-04-10 NOTE — TELEPHONE ENCOUNTER
Pls call Deloris and let her know that the sleep clinic is only able to see her if she has a sleep related diagnosis. The dx of Family History of AD won't work. I know that overall she feels healthy, but does she ever have daytime sleepiness or generalized fatigue? Most of us experience this at times, so my guess is that she can say yes. But, if not, she can let me know in the future if she develops fatigue, and I'll place another referral then. Thx.

## 2025-04-10 NOTE — TELEPHONE ENCOUNTER
Pt requested sleep eval bc she read about the association between Alzheimer's Dz (AD), and she has positive family history of AD. Can Laine see her based on her concern?

## 2025-04-10 NOTE — TELEPHONE ENCOUNTER
Order for sleep medicine has dx code of alzheimer's, please correct to sleep related dx and resubmit

## 2025-05-01 DIAGNOSIS — E03.9 HYPOTHYROIDISM (ACQUIRED): ICD-10-CM

## 2025-05-01 LAB
T4 FREE SERPL-MCNC: 1.3 NG/DL (ref 0.9–1.8)
TSH SERPL DL<=0.005 MIU/L-ACNC: 2.36 UIU/ML (ref 0.27–4.2)

## 2025-05-02 ENCOUNTER — RESULTS FOLLOW-UP (OUTPATIENT)
Dept: ENDOCRINOLOGY | Age: 60
End: 2025-05-02

## 2025-05-07 RX ORDER — HYDROXYZINE HYDROCHLORIDE 25 MG/1
TABLET, FILM COATED ORAL
Qty: 90 TABLET | Refills: 3 | Status: SHIPPED | OUTPATIENT
Start: 2025-05-07

## 2025-05-07 NOTE — TELEPHONE ENCOUNTER
Last Office Visit  -  11/21/2024  Next Office Visit  -  11/24/2025    Last Filled  -    Last UDS -    Contract -

## 2025-05-23 RX ORDER — DILTIAZEM HYDROCHLORIDE 180 MG/1
CAPSULE, COATED, EXTENDED RELEASE ORAL DAILY
Qty: 90 CAPSULE | Refills: 3 | Status: SHIPPED | OUTPATIENT
Start: 2025-05-23

## 2025-05-23 NOTE — TELEPHONE ENCOUNTER
Last Office Visit: 8/12/2024 Provider: ANASTASIA  **Is provider OOT? No    Next Office Visit: N/A   Provider: N/A   **If no OV, when does pt need to be seen? in 1 year(s)  **Has patient already had 30 day supply? No    Lab orders needed? no   Encounter provider correct? Yes If not, change provider  Script changes since last refill? no    LAST LABS:   N/A     **Care Everywhere? no       Front- pt needs annual scheduled w/ ANASTASIA

## 2025-06-20 DIAGNOSIS — E03.9 HYPOTHYROIDISM (ACQUIRED): ICD-10-CM

## 2025-06-20 LAB
T4 FREE SERPL-MCNC: 1.2 NG/DL (ref 0.9–1.8)
TSH SERPL DL<=0.005 MIU/L-ACNC: 2.9 UIU/ML (ref 0.27–4.2)

## 2025-06-23 DIAGNOSIS — R42 VERTIGO: ICD-10-CM

## 2025-06-23 DIAGNOSIS — H93.8X3 EAR PRESSURE, BILATERAL: ICD-10-CM

## 2025-06-23 RX ORDER — FLUTICASONE PROPIONATE 50 MCG
2 SPRAY, SUSPENSION (ML) NASAL DAILY
Qty: 48 ML | Refills: 3 | Status: SHIPPED | OUTPATIENT
Start: 2025-06-23

## 2025-06-24 ENCOUNTER — RESULTS FOLLOW-UP (OUTPATIENT)
Dept: ENDOCRINOLOGY | Age: 60
End: 2025-06-24

## 2025-08-11 ENCOUNTER — OFFICE VISIT (OUTPATIENT)
Dept: CARDIOLOGY CLINIC | Age: 60
End: 2025-08-11
Payer: COMMERCIAL

## 2025-08-11 VITALS
BODY MASS INDEX: 25.45 KG/M2 | DIASTOLIC BLOOD PRESSURE: 78 MMHG | HEIGHT: 65 IN | SYSTOLIC BLOOD PRESSURE: 128 MMHG | WEIGHT: 152.78 LBS | HEART RATE: 77 BPM | OXYGEN SATURATION: 98 %

## 2025-08-11 DIAGNOSIS — E78.5 HYPERLIPIDEMIA, UNSPECIFIED HYPERLIPIDEMIA TYPE: ICD-10-CM

## 2025-08-11 DIAGNOSIS — E03.9 HYPOTHYROIDISM, UNSPECIFIED TYPE: ICD-10-CM

## 2025-08-11 DIAGNOSIS — I47.10 PSVT (PAROXYSMAL SUPRAVENTRICULAR TACHYCARDIA): Primary | ICD-10-CM

## 2025-08-11 DIAGNOSIS — I47.10 SVT (SUPRAVENTRICULAR TACHYCARDIA): ICD-10-CM

## 2025-08-11 PROCEDURE — 93000 ELECTROCARDIOGRAM COMPLETE: CPT

## 2025-08-11 PROCEDURE — 99214 OFFICE O/P EST MOD 30 MIN: CPT

## 2025-08-11 RX ORDER — MAGNESIUM GLYCINATE 100 MG
CAPSULE ORAL
COMMUNITY

## 2025-08-14 ENCOUNTER — HOSPITAL ENCOUNTER (OUTPATIENT)
Dept: WOMENS IMAGING | Age: 60
Discharge: HOME | End: 2025-08-14
Payer: COMMERCIAL

## 2025-08-14 VITALS — BODY MASS INDEX: 25.33 KG/M2 | HEIGHT: 65 IN | WEIGHT: 152 LBS

## 2025-08-14 DIAGNOSIS — Z12.31 OTHER SCREENING MAMMOGRAM: ICD-10-CM

## 2025-08-14 PROCEDURE — 77063 BREAST TOMOSYNTHESIS BI: CPT

## 2025-08-17 DIAGNOSIS — E03.9 HYPOTHYROIDISM (ACQUIRED): ICD-10-CM

## 2025-08-18 RX ORDER — LEVOTHYROXINE SODIUM 88 UG/1
88 TABLET ORAL DAILY
Qty: 90 TABLET | Refills: 1 | Status: SHIPPED | OUTPATIENT
Start: 2025-08-18